# Patient Record
Sex: MALE | Race: WHITE | Employment: FULL TIME | URBAN - METROPOLITAN AREA
[De-identification: names, ages, dates, MRNs, and addresses within clinical notes are randomized per-mention and may not be internally consistent; named-entity substitution may affect disease eponyms.]

---

## 2017-03-23 ENCOUNTER — OFFICE VISIT (OUTPATIENT)
Dept: ORTHOPEDIC SURGERY | Age: 37
End: 2017-03-23

## 2017-03-23 VITALS
WEIGHT: 185 LBS | DIASTOLIC BLOOD PRESSURE: 84 MMHG | HEIGHT: 73 IN | SYSTOLIC BLOOD PRESSURE: 133 MMHG | HEART RATE: 77 BPM | BODY MASS INDEX: 24.52 KG/M2

## 2017-03-23 DIAGNOSIS — Z98.890 STATUS POST ANTERIOR CRUCIATE LIGAMENT SURGERY: Primary | ICD-10-CM

## 2017-03-23 PROCEDURE — 99203 OFFICE O/P NEW LOW 30 MIN: CPT | Performed by: ORTHOPAEDIC SURGERY

## 2017-03-23 RX ORDER — FINASTERIDE 5 MG/1
5 TABLET, FILM COATED ORAL DAILY
COMMUNITY
End: 2020-11-12

## 2019-04-12 ENCOUNTER — OFFICE VISIT (OUTPATIENT)
Dept: ORTHOPEDIC SURGERY | Age: 39
End: 2019-04-12
Payer: COMMERCIAL

## 2019-04-12 VITALS
WEIGHT: 190 LBS | HEIGHT: 73 IN | SYSTOLIC BLOOD PRESSURE: 126 MMHG | DIASTOLIC BLOOD PRESSURE: 70 MMHG | HEART RATE: 69 BPM | BODY MASS INDEX: 25.18 KG/M2

## 2019-04-12 DIAGNOSIS — M25.561 RIGHT KNEE PAIN, UNSPECIFIED CHRONICITY: Primary | ICD-10-CM

## 2019-04-12 PROBLEM — J30.9 AR (ALLERGIC RHINITIS): Status: ACTIVE | Noted: 2019-04-12

## 2019-04-12 PROBLEM — M48.02 FORAMINAL STENOSIS OF CERVICAL REGION: Status: ACTIVE | Noted: 2018-12-26

## 2019-04-12 PROBLEM — R31.9 HEMATURIA OF UNDIAGNOSED CAUSE: Status: ACTIVE | Noted: 2019-04-12

## 2019-04-12 PROCEDURE — 99204 OFFICE O/P NEW MOD 45 MIN: CPT | Performed by: ORTHOPAEDIC SURGERY

## 2019-04-12 SDOH — HEALTH STABILITY: MENTAL HEALTH: HOW OFTEN DO YOU HAVE A DRINK CONTAINING ALCOHOL?: NEVER

## 2019-04-12 NOTE — PROGRESS NOTES
Chief Complaint  Knee Pain (np right knee. )      History of Present Illness:  Joey Agarwal is a pleasant 37 y.o. male here today for evaluation of RIGHT knee. 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 Comes in with 6 weeks of RIGHT knee pain after hitting his right foot causing him to hyperextend his right knee. He has been taking ibuprofen at home without much relief. He continues to have persistent right knee pain. No numbness or tingling reported in the right lower extremity. He is concerned because he is a very active individual and wants to be ready to enjoy summer. Medical History:  Patient's medications, allergies, past medical, surgical, social and family histories were reviewed and updated as appropriate. Pertinent items are noted in HPI  Review of systems reviewed from Patient History Form dated on 4/12/2019 and available in the patient's chart under the Media tab. Vital Signs:  Vitals:    04/12/19 1003   BP: 126/70   Pulse: 69         Neuro: Alert & oriented x 3,  normal,  no focal deficits noted. Normal affect. Eyes: sclera clear  Ears: Normal external ear  Mouth:  No perioral lesions  Pulm: Respirations unlabored and regular  Pulse: Regular rate and rhythm   Skin: Warm, well perfused      Constitutional: In no apparent distress. Normal affect. Alert and oriented X3 and is cooperative. RIGHT knee exam    Gait: No use of assistive devices. No antalgic gait. Alignment: normal alignment. Inspection/skin: Skin is intact without erythema or ecchymosis. No gross deformity. Palpation: Mild medial joint line tenderness. no lateral joint line tenderness present. Patellofemoral crepitus. no pain with compression of the patella. No bursal swelling is present. Range of Motion: There is full range of motion. Strength: Normal quadriceps development. Effusion: Small effusion. No swelling present. Ligamentous stability: No cruciate or collateral ligament instability. Anterior and posterior drawer signs are negative. Lachman sign is negative. Neurologic and vascular: Skin is warm and well-perfused. There is no pretibial edema. Pulses are symmetric. Sensation is intact to light-touch    Special tests: Mildly positive El sign. The patella apprehension sign is negative. LEFT knee exam    Gait: No use of assistive devices. No antalgic gait. Alignment: normal alignment. Inspection/skin: Skin is intact without erythema or ecchymosis. No gross deformity. Palpation: mild crepitus. no joint line tenderness present. Range of Motion: There is full range of motion. Strength: Normal quadriceps development. Effusion: No effusion or swelling present. Ligamentous stability: No cruciate or collateral ligament instability. Neurologic and vascular: Skin is warm and well-perfused. Sensation is intact to light-touch. Special tests: Negative El sign. Radiology:     Radiographs were obtained and reviewed in the office; 4 views: bilateral PA, bilateral Corwin Musca, bilateral Merchants AND RIGHT lateral    Impression: mild degenerative change         Assessment :  39yo male 20 years status of ACL reconstruction with 6 weeks of persistent right knee pain with concern for a medial meniscus tear. Impression:  Encounter Diagnosis   Name Primary?     Right knee pain, unspecified chronicity Yes       Office Procedures:  Orders Placed This Encounter   Procedures    XR KNEE RIGHT (MIN 4 VIEWS)     Standing Status:   Future     Number of Occurrences:   1     Standing Expiration Date:   4/12/2020    XR KNEE LEFT (3 VIEWS)     Standing Status:   Future     Number of Occurrences:   1     Standing Expiration Date:   4/12/2020    MRI KNEE RIGHT WO CONTRAST     Standing Status:   Future     Standing Expiration Date:   4/12/2020     Order Specific Question:   Reason for exam: Answer:   MRI R KNEE W/3D  R/O MMT     Order Specific Question:   Reason for exam:     Answer:   Rajesh Otero 4/15         Plan: X-rays and suspected diagnosis were reviewed with the patient. At this time we would obtain an MRI of his right knee to rule out a medial meniscus tear. He is also requesting a new right knee [employer] motorcross brace, he reports that every so many years he gets a new one. We will have our DME coordinate the brace. Followup for MRI results or sooner if needed. Precious Beverly is in agreement with this plan. All questions were answered to patient's satisfaction and was encouraged to call with any further questions. Reagan Benoit PA-C  4/12/2019       During this examination, I, Reagan Benoit PA-C, functioned as a scribe for Dr. Farheen Terrell. The history taking and physical examination were performed by Dr. Farheen Terrell. All counseling during the appointment was performed between the patient and Dr. Farheen Terrell. 4/12/2019 10:49 AM      This dictation was performed with a verbal recognition program (DRAGON) and it was checked for errors. It is possible that there are still dictated errors within this office note. If so, please bring any errors to my attention for an addendum. All efforts were made to ensure that this office note is accurate. I personally reviewed the patient's pain scale, review of systems, family history, social history, past medical history, allergies and medications. 13 point review of systems was collected and reviewed today. It is noncontributory. I personally performed the services described in this documentation and scribed by Reagan Benoit PA-C. Lilly Weeks MD  Sports Medicine, Arthroscopic Knee and Shoulder Surgery    This dictation was performed with a verbal recognition program [facility]) and it was checked for errors. It is possible that there are still dictated errors within this office note.   If so, please bring any errors to my attention for an addendum. All efforts were made to ensure that this office note is accurate.

## 2019-04-12 NOTE — PROGRESS NOTES
Review of Systems   Musculoskeletal:        Right knee pain    All other systems reviewed and are negative.

## 2020-09-29 ENCOUNTER — OFFICE VISIT (OUTPATIENT)
Dept: ORTHOPEDIC SURGERY | Age: 40
End: 2020-09-29
Payer: COMMERCIAL

## 2020-09-29 VITALS — TEMPERATURE: 97.3 F | BODY MASS INDEX: 26.51 KG/M2 | WEIGHT: 200 LBS | HEIGHT: 73 IN

## 2020-09-29 PROCEDURE — 99213 OFFICE O/P EST LOW 20 MIN: CPT | Performed by: PHYSICIAN ASSISTANT

## 2020-09-30 NOTE — PROGRESS NOTES
None    Number of children: None    Years of education: None    Highest education level: None   Occupational History    None   Social Needs    Financial resource strain: None    Food insecurity     Worry: None     Inability: None    Transportation needs     Medical: None     Non-medical: None   Tobacco Use    Smoking status: Never Smoker    Smokeless tobacco: Never Used   Substance and Sexual Activity    Alcohol use: Never     Frequency: Never    Drug use: Never    Sexual activity: None   Lifestyle    Physical activity     Days per week: None     Minutes per session: None    Stress: None   Relationships    Social connections     Talks on phone: None     Gets together: None     Attends Anglican service: None     Active member of club or organization: None     Attends meetings of clubs or organizations: None     Relationship status: None    Intimate partner violence     Fear of current or ex partner: None     Emotionally abused: None     Physically abused: None     Forced sexual activity: None   Other Topics Concern    None   Social History Narrative    None       Current Outpatient Medications   Medication Sig Dispense Refill    finasteride (PROSCAR) 5 MG tablet Take 5 mg by mouth daily      Mirabegron ER (MYRBETRIQ) 25 MG TB24 Take by mouth       No current facility-administered medications for this visit. No Known Allergies      Review of Systems:  A 14 point review of systems was completed by the patient and is available in the media section of the scanned medical record and was reviewed on 9/30/2020. The review is negative with the exception of those things mentioned in the HPI and Past Medical History   Review of Systems   Musculoskeletal: Positive for arthralgias. Negative for myalgias. Neurological: Negative for weakness and numbness.           Vital Signs:   Temp 97.3 °F (36.3 °C) (Infrared)   Ht 6' 1\" (1.854 m)   Wt 200 lb (90.7 kg)   BMI 26.39 kg/m²     General Exam: General: Brisa Sandoval is a healthy and well appearing 41y.o. year old male who is sitting comfortably in our office in no acute distress. Neuro: Alert & Oriented x 3,  normal,  no focal deficits noted. Normal mood & affect. Eyes: Sclera clear  Ears: Normal external ear  Mouth: Patient wearing a mask  Pulm: Respirations unlabored and regular   Skin: Warm, well perfused      Knee Examination: Right    Inspection:  Mild effusion. No ecchymosis, discoloration, erythema, excessive warmth. no gross deformities, no signs of infection. Palpation: There is patellofemoral crepitus, there is lateral and posteriolateral joint line tenderness. TTP of the posterolateral corner and mild LCL tenderness. No other osseous or soft tissue tenderness around the knee. Negative calf tenderness    Range of Motion: 0-110 degrees with comfort to the lateral and posteriolateral joint line at terminal flexion and extension. Appropriate patellar mobility    Strength:  5/5 quadriceps, 5/5 hamstrings    Special Tests:  Valgus and varus stress test are stable at 0 and 30°. Apprehension with Lockman's. Negative posterior drawer. Negative Homans sign. Positive El's with lateral joint line tenderness. Gait: Steady, Non antalgic, without the use of assistive devices    Alignment: Neutral    Neuro: Sensation equal bilateral lower extremities    Vascular: 2+ posterior tibialis pulse        Radiology:     X-rays obtained and reviewed in office: AP and merchant view of both knees and a lateral of the right. Impression: Visible bone tunnels from prior ACL reconstruction. No fractures, dislocations, visible tumors, or signs of acute trauma. Patient exhibits mildly decreased joint space in the medial and lateral femoral-tibial compartments bilaterally. Patient has decreased joint spacing in the patellofemoral joints bilaterally. Patella is riding appropriately in the trochlear groove with no subluxation or tilt noted.   No osteophytes or bone growths noted. No loose bodies or foreign bodies. Office Procedures:  Orders Placed This Encounter   Procedures    XR KNEE RIGHT (3 VIEWS)     Standing Status:   Future     Number of Occurrences:   1     Standing Expiration Date:   9/29/2021            Assessment: Patient is a 41 y.o. male presenting to the office for an evaluation of his acute right knee pain. Patient is a working diagnosis of a posterior lateral corner injury, lateral meniscus tear, and or an LCL sprain. Visit Diagnoses       Codes    Injury of posterolateral corner of right knee, initial encounter    -  Primary S89. 91XA    Right knee pain, unspecified chronicity     M25.561          Medical decision making:  Patient's workup and evaluation were reviewed with the patient in the office today. Patient's x-ray imaging was reviewed with him in the office today. Given the patient's history and physical exam I believe the reasonable degree of medical certainty that the patient has sustained a posterior lateral corner injury or a meniscus tear. For this reason I believe the patient has a medical necessity to obtain an MRI of the right knee to evaluate for soft tissue pathology. Patient was educated on conservative therapy as detailed in the treatment plan below. He should follow-up in the office once receiving MRI imaging to review the results and further coordinate care. All the patient's questions were answered while in the clinic. The patient is understanding of all instructions and agrees with the plan. Patient does not smoke and did not require smoking cessation patient education. Treatment Plan:    1. MRI of the right knee without contrast: Investigating for possible posterior lateral corner injury as well as a lateral meniscus tear and LCL sprain. Majority of the patient's pain is in the posterior lateral corner.   2. Refrain from dirt bike racing or any recreational or physical activity that worsens pain or symptoms  3. Activity modification  4. Ice 20 minutes ever 1-2 hours PRN  5. NSAIDs as needed  6. Rest   7. Elevation at least 2 inches above the heart with pillows supporting the joint underneath  8. Lightweight exercise/ROM  9. Appropriate diet/weight management      Follow up: 1 week or PRN            Corby Tovar PA-C    Physician Assistant - Certified    92/58/53 10:35 AM      This dictation was performed with a verbal recognition program (DRAGON) and it was checked for errors. It is possible that there are still dictated errors within this office note. If so, please bring any errors to my attention for an addendum. All efforts were made to ensure that this office note is accurate.

## 2020-10-15 ENCOUNTER — OFFICE VISIT (OUTPATIENT)
Dept: ORTHOPEDIC SURGERY | Age: 40
End: 2020-10-15
Payer: COMMERCIAL

## 2020-10-15 VITALS — TEMPERATURE: 97.7 F | HEIGHT: 73 IN | WEIGHT: 199.96 LBS | BODY MASS INDEX: 26.5 KG/M2

## 2020-10-15 PROCEDURE — 99213 OFFICE O/P EST LOW 20 MIN: CPT | Performed by: ORTHOPAEDIC SURGERY

## 2020-10-18 NOTE — PROGRESS NOTES
12 [facility]  History and Physical  Knee Pain    Date:  10/15/2020    Name:  Fern Fonseca  Address:  [address]  [address] [address]    :  1980      Age:   41 y.o.    SSN:  [id number]      Medical Record Number:  <[id number]>      Chief Complaint    Follow-up (right knee, MRI results )      History of Present Illness:  Fern Fonseca is a 41 y.o. male who presents for follow-up evaluation of his right knee pain and review of his MRI results. This patient is a previous patient of Dr. Leonardo Bruno and has a known past medical history of an ACL reconstruction over 20 years ago in the right knee. He also is a known MCL reconstruction. Patient races dirt bikes competitively and states that approximately 2 days ago he was attempting to make a right turn with his right leg extended and felt that his leg hit the dirt and \"jammed\" superiorly. Since then he has been experiencing pain on the lateral and posterior lateral aspect of the right knee. He notes swelling and effusion with pain at endrange knee flexion and extension. He notes mild instability and intermittent sensation of catching. He is attempted conservative therapy for this condition including: rest, ice, elevation, bracing, activity modification, and NSAIDs as needed. Today the patient feels that his pain is gotten a bit better. However he experiences majority of his pain when he flexes his knee past 90 degrees. Patient once again attest that his symptoms have been persistent for the last 3 to 4 weeks. The patient denies any new injury. The patient denies any giving way, joint locking, numbness, paresthesias, or weakness. Pain Assessment  Location of Pain: Knee  Location Modifiers: Right  Severity of Pain: 4  Quality of Pain: Dull, Aching  Duration of Pain: Persistent  Frequency of Pain: Constant  Aggravating Factors:  Other (Comment)(flexion)  Relieving Factors: Rest  Result of Injury: Yes  Work-Related Injury: No  Are there other pain locations you wish to document?: No    No past medical history on file. No past surgical history on file. No family history on file. Social History     Socioeconomic History    Marital status:      Spouse name: None    Number of children: None    Years of education: None    Highest education level: None   Occupational History    None   Social Needs    Financial resource strain: None    Food insecurity     Worry: None     Inability: None    Transportation needs     Medical: None     Non-medical: None   Tobacco Use    Smoking status: Never Smoker    Smokeless tobacco: Never Used   Substance and Sexual Activity    Alcohol use: Never     Frequency: Never    Drug use: Never    Sexual activity: None   Lifestyle    Physical activity     Days per week: None     Minutes per session: None    Stress: None   Relationships    Social connections     Talks on phone: None     Gets together: None     Attends Pentecostal service: None     Active member of club or organization: None     Attends meetings of clubs or organizations: None     Relationship status: None    Intimate partner violence     Fear of current or ex partner: None     Emotionally abused: None     Physically abused: None     Forced sexual activity: None   Other Topics Concern    None   Social History Narrative    None       Current Outpatient Medications   Medication Sig Dispense Refill    finasteride (PROSCAR) 5 MG tablet Take 5 mg by mouth daily      Mirabegron ER (MYRBETRIQ) 25 MG TB24 Take by mouth       No current facility-administered medications for this visit. No Known Allergies      Review of Systems:  A 14 point review of systems was completed by the patient and is available in the media section of the scanned medical record and was reviewed on 10/18/2020.   The review is negative with the exception of those things mentioned in the HPI and Past Medical History Review of Systems   Musculoskeletal: Positive for arthralgias. Negative for myalgias. Neurological: Negative for weakness and numbness. Vital Signs:   Temp 97.7 °F (36.5 °C) (Infrared)   Ht 6' 0.99\" (1.854 m)   Wt 199 lb 15.3 oz (90.7 kg)   BMI 26.39 kg/m²     General Exam:    General: Dee Dee Francis is a healthy and well appearing 41y.o. year old male who is sitting comfortably in our office in no acute distress. Neuro: Alert & Oriented x 3,  normal,  no focal deficits noted. Normal mood & affect. Eyes: Sclera clear  Ears: Normal external ear  Mouth: Patient wearing a mask  Pulm: Respirations unlabored and regular   Skin: Warm, well perfused      Knee Examination: Right     Inspection:  Mild effusion. No ecchymosis, discoloration, erythema, excessive warmth. no gross deformities, no signs of infection.      Palpation: There is patellofemoral crepitus, there is lateral and posteriolateral joint line tenderness. TTP of the posterolateral corner and mild LCL tenderness. No other osseous or soft tissue tenderness around the knee. Negative calf tenderness     Range of Motion: 0-110 degrees with comfort to the lateral and posteriolateral joint line at terminal flexion and extension.      Strength: Grossly intact     Special Tests: Negative Homans sign. Positive El's with lateral joint line tenderness.     Gait: Steady, Non antalgic, without the use of assistive devices     Alignment: Neutral     Neuro: Sensation equal bilateral lower extremities     Vascular: 2+ posterior tibialis pulse         Radiology:   MRI of the right knee without contrast conducted on 10/05/2020  Impression  1. Radial tear of the posterior horn of the lateral meniscus measuring approximately 1 cm in length with mild meniscus extrusion and mild capsulitis. 2.  Cleavage tear to the body of the lateral meniscus measuring 2 cm in length. 3.  Status post ACL reconstruction.   ACL graft intact without evidence of cruciate insufficiency. 4.  Subchondral fracture to the posterior lateral tibial plateau with subchondral edema. 5.  Insertional quadricep tendinitis without micro tear. Assessment: Patient is a 41 y.o. male presenting to the office for review of his MRI results. Patient has diagnosis of a lateral meniscus tear in the right knee as well as a subchondral fracture of the posterior lateral tibial plateau. Visit Diagnoses       Codes    Acute lateral meniscus tear of right knee, initial encounter    -  Primary S83.281A    Right knee pain, unspecified chronicity     M25.561          Medical decision making:  Patient's workup and evaluation were reviewed with the patient in the office today. Patient's MRI results and imaging were thoroughly reviewed with him in the office today. Given the patient's history and physical exam I believe with a reasonable degree of medical certainty that the most appropriate course of treatment for this patient is to undergo a right knee arthroscopy for partial lateral meniscectomy versus repair. Patient was educated on conservative versus surgical treatment options as well as the risks and benefits of both. Patient agrees that the most appropriate course of action is a right knee arthroscopy. In addition, we will also be sending the MRI for a reread by Dr. Winnie Redding. There is some question as to the type of tear as well as the extent of the tear. The MRI read notes a 1 cm tear with mild extrusion but also notes a 2 cm cleavage tear to the body of the meniscus. We would like additional clarification. All the patient's questions were answered while in the clinic. The patient is understanding of all instructions and agrees with the plan. Patient does not smoke and did not require smoking cessation patient education. Treatment Plan:    1. Begin preoperative planning  2. Activity modification  3. Ice 20 minutes ever 1-2 hours PRN  4. NSAIDs as needed  5. Rest   6.  Elevation at least 2 inches above the heart with pillows supporting the joint underneath  7. Lightweight exercise/low impact exercise  8. Appropriate diet/weight management      Follow up: RAZ Mancilla PA-C    Physician Assistant - Certified    97/94/67 4:00 PM    During this examination, ITeresa [place], functioned as a scribe for Dr. Lane Chaudhari. This dictation was performed with a verbal recognition program (DRAGON) and it was checked for errors. It is possible that there are still dictated errors within this office note. If so, please bring any errors to my attention for an addendum. All efforts were made to ensure that this office note is accurate. This dictation was performed with a verbal recognition program (DRAGON) and it was checked for errors. It is possible that there are still dictated errors within this office note. If so, please bring any errors to my attention for an addendum. All efforts were made to ensure that this office note is accurate. Supervising Physician Attestation:  I, Dr. Lane Chaudhari, personally performed the services described in this documentation as scribed above, and it is both accurate and complete and I agree with all pertinent clinical information. I personally interviewed the patient and performed a physical examination and medical decision making. I discussed the patient's condition and treatment options and have  reviewed and agree with the past medical, family and social history unless otherwise noted. All of the patient's questions were answered.       Board Certified Orthopaedic Surgeon  [address] [facility] and [address] and [address] and [employer]  Professor of [facility] Sadi Bhatt

## 2020-11-06 ENCOUNTER — TELEPHONE (OUTPATIENT)
Dept: ORTHOPEDIC SURGERY | Age: 40
End: 2020-11-06

## 2020-11-09 NOTE — PROGRESS NOTES
[facility] [email or web address] / [facility] ([facility]) [address] [facility], [address]    Acknowledgment of Informed Consent for Surgical or Medical Procedure and Sedation  I agree to allow doctor(s) Helga Forrest and his/her associates or assistants, including residents and/or other qualified medical practitioner to perform the following medical treatment or procedure and to administer or direct the administration of sedation as necessary:  Procedure(s): RIGHT KNEE SCOPE [address] / Arlen Bruno  My doctor has explained the following regarding the proposed procedure:   the explanation of the procedure   the benefits of the procedure   the potential problems that might occur during recuperation   the risks and side effects of the procedure which could include but are not limited to severe blood loss, infection, stroke or death   the benefits, risks and side effect of alternative procedures including the consequences of declining this procedure or any alternative procedures   the likelihood of achieving satisfactory results. I acknowledge no guarantee or assurance has been made to me regarding the results. I understand that during the course of this treatment/procedure, unforeseen conditions can occur which require an additional or different procedure. I agree to allow my physician or assistants to perform such extension of the original procedure as they may find necessary. I understand that sedation will often result in temporary impairment of memory and fine motor skills and that sedation can occasionally progress to a state of deep sedation or general anesthesia. I understand the risks of anesthesia for surgery include, but are not limited to, sore throat, hoarseness, injury to face, mouth, or teeth; nausea; headache; injury to blood vessels or nerves; death, brain damage, or paralysis.     I understand that if I have a Limitation of Treatment order in effect during my hospitalization, the order may or may not be in effect during this procedure. I give my doctor permission to give me blood or blood products. I understand that there are risks with receiving blood such as hepatitis, AIDS, fever, or allergic reaction. I acknowledge that the risks, benefits, and alternatives of this treatment have been explained to me and that no express or implied warranty has been given by the hospital, any blood bank, or any person or entity as to the blood or blood components transfused. At the discretion of my doctor, I agree to allow observers, equipment/product representatives and allow photographing, and/or televising of the procedure, provided my name or identity is maintained confidentially. I agree the hospital may dispose of or use for scientific or educational purposes any tissue, fluid, or body parts which may be removed.     ________________________________Date________Time______ am/pm  ([place] [facility])  Patient or Signature of Closest Relative or Legal Guardian    ________________________________Date________Time______am/pm      Page 1 of  1  Witness

## 2020-11-11 ENCOUNTER — NURSE ONLY (OUTPATIENT)
Dept: PRIMARY CARE CLINIC | Age: 40
End: 2020-11-11
Payer: COMMERCIAL

## 2020-11-11 PROCEDURE — 99211 OFF/OP EST MAY X REQ PHY/QHP: CPT | Performed by: NURSE PRACTITIONER

## 2020-11-12 ENCOUNTER — OFFICE VISIT (OUTPATIENT)
Dept: ORTHOPEDIC SURGERY | Age: 40
End: 2020-11-12
Payer: COMMERCIAL

## 2020-11-12 ENCOUNTER — HOSPITAL ENCOUNTER (OUTPATIENT)
Dept: PHYSICAL THERAPY | Age: 40
Setting detail: THERAPIES SERIES
Discharge: HOME | End: 2020-11-12
Payer: COMMERCIAL

## 2020-11-12 VITALS — HEIGHT: 73 IN | WEIGHT: 199.96 LBS | TEMPERATURE: 97.9 F | BODY MASS INDEX: 26.5 KG/M2

## 2020-11-12 LAB — SARS-COV-2: NOT DETECTED

## 2020-11-12 PROCEDURE — MISC250 COMPRESSION STOCKING: Performed by: ORTHOPAEDIC SURGERY

## 2020-11-12 PROCEDURE — 97162 PT EVAL MOD COMPLEX 30 MIN: CPT | Performed by: PHYSICAL THERAPIST

## 2020-11-12 PROCEDURE — 97530 THERAPEUTIC ACTIVITIES: CPT | Performed by: PHYSICAL THERAPIST

## 2020-11-12 PROCEDURE — 99213 OFFICE O/P EST LOW 20 MIN: CPT | Performed by: ORTHOPAEDIC SURGERY

## 2020-11-12 RX ORDER — M-VIT,TX,IRON,MINS/CALC/FOLIC 27MG-0.4MG
1 TABLET ORAL DAILY
COMMUNITY

## 2020-11-12 ASSESSMENT — ENCOUNTER SYMPTOMS
GASTROINTESTINAL NEGATIVE: 1
RESPIRATORY NEGATIVE: 1
EYES NEGATIVE: 1
ALLERGIC/IMMUNOLOGIC NEGATIVE: 1

## 2020-11-12 NOTE — FLOWSHEET NOTE
41.8 41.2   5cm above 47.5 45.3   15cm above          Reflexes/Sensation:               [x]? Dermatomes/Myotomes intact               []?Reflexes equal and normal bilaterally              []?Other:     Joint mobility:                [x]? Normal                       []?Hypo              []?Hyper     Palpation: LJL     Functional Mobility/Transfers: mod ind     Posture: good     Bandages/Dressings/Incisions: n/a     Gait: (include devices/WB status) WNL       RESTRICTIONS/PRECAUTIONS: none    Exercises/Interventions:   Exercise/Equipment Resistance/Repetitions Other comments   Stretching     Hamstring     Towel Pull     Inclined Calf     Hip Flexion     ITB     Groin     Quad                    SLR     Supine     Abduction     Adduction     Prone     SLR+          Isometrics     Quad sets          Patellar Glides     Medial     Superior     Inferior          ROM     Sheet Pulls     Hang Weights     Passive     Active     Weight Shift     Ankle Pumps                    CKC     Calf raises     Wall sits     Step ups     1 leg stand     Squatting     CC TKE     Balance     bridges          PRE     Extension  RANGE:   Flexion  RANGE:        Quantum machines     Leg press      Leg extension     Leg curl          Manual interventions                     Therapeutic Exercise and NMR EXR  [x] (72569) Provided verbal/tactile cueing for activities related to strengthening, flexibility, endurance, ROM for improvements in LE, proximal hip, and core control with self care, mobility, lifting, ambulation.  [] (70415) Provided verbal/tactile cueing for activities related to improving balance, coordination, kinesthetic sense, posture, motor skill, proprioception  to assist with LE, proximal hip, and core control in self care, mobility, lifting, ambulation and eccentric single leg control.      NMR and Therapeutic Activities:    [x] (55737 or 81183) Provided verbal/tactile cueing for activities related to improving balance, coordination, Adjusted     Therapist goals for Patient:   Short Term Goals: To be achieved in: 2 weeks  1. Independent in HEP and progression per patient tolerance, in order to prevent re-injury. []? Progressing: []? Met: []? Not Met: []? Adjusted   2. Patient will have a decrease in pain to facilitate improvement in movement, function, and ADLs as indicated by Functional Deficits. []? Progressing: []? Met: []? Not Met: []? Adjusted     Long Term Goals: To be achieved in: 12 weeks  1. Disability index score of 15% or less for the LEFS to assist with reaching prior level of function. []? Progressing: []? Met: []? Not Met: []? Adjusted  2. Patient will demonstrate increased AROM to 0-140 deg to allow for proper joint functioning as indicated by patients Functional Deficits. []? Progressing: []? Met: []? Not Met: []? Adjusted  3. Patient will demonstrate an increase in Strength to good proximal hip strength and control, within 5lb HHD in LE to allow for proper functional mobility as indicated by patients Functional Deficits. []? Progressing: []? Met: []? Not Met: []? Adjusted  4. Patient will return to household functional activities without increased symptoms or restriction. []? Progressing: []? Met: []? Not Met: []? Adjusted  5. Pt will be able to walk for at least 20 min without symptoms present. (patient specific functional goal)    []? Progressing: []? Met: []? Not Met: []? Adjusted          Overall Progression Towards Functional goals/ Treatment Progress Update:  [] Patient is progressing as expected towards functional goals listed. [] Progression is slowed due to complexities/Impairments listed. [] Progression has been slowed due to co-morbidities.   [x] Plan just implemented, too soon to assess goals progression <30days   [] Goals require adjustment due to lack of progress  [] Patient is not progressing as expected and requires additional follow up with physician  [] Other    Prognosis for POC: [x] Good [] Fair  [] Poor      Patient requires continued skilled intervention: [x] Yes  [] No    Treatment/Activity Tolerance:  [x] Patient able to complete treatment  [] Patient limited by fatigue  [] Patient limited by pain     [] Patient limited by other medical complications  [] Other:     HEP instruction:   Access Code: [email or web address]       URL: [email or web address]   Date: 11/12/2020   Prepared by: Mirella Ramirez      Exercises  Seated Table Hamstring Stretch - 5 reps - 1 sets - 30 hold - 3x daily - 7x weekly  Long Sitting Calf Stretch with Strap - 5 reps - 1 sets - 30 hold - 3x daily - 7x weekly  Long Sitting Quad Set - 10 reps - 1 sets - 10 hold - 12x daily - 7x weekly  Long Sitting Isometric Hip Adduction and Extension with Ball at Knees - 10 reps - 1 sets - 10 hold - 3x daily - 7x weekly  Supine Straight Leg Raises - 10 reps - 3 sets - 3x daily - 7x weekly   Sidelying Hip Abduction - 10 reps - 3 sets - 3x daily - 7x weekly  Supine Ankle Pumps - 10 reps - 3 sets - 12x daily - 7x weekly  Supine Knee Extension Stretch on Towel Roll - 1 reps - 10 min hold - 3-6x daily - 7x weekly  Seated Active Assistive Knee Flexion and Extension - 10 reps - 1 sets - 10 sec hold - 3-6x daily - 7x weekly  Sitting Heel Slide with Towel - 10 reps - 1 sets - 10 sec hold - 3-6x daily - 7x weekly       PLAN: See eval  [] Continue per plan of care [] Alter current plan (see comments above)  [x] Plan of care initiated [] Hold pending MD visit [] Discharge      Electronically signed by:  Nora Romero PT, DPT    Note: If patient does not return for scheduled/ recommended follow up visits, this note will serve as a discharge from care along with most recent update on progress.

## 2020-11-12 NOTE — PROGRESS NOTES
[facility]  History and Physical      Date:  2020    Name:  Moris Pinto  Address:  [address]  [address] [address]    :  1980      Age:   41 y.o.    SSN:  [id number]      Medical Record Number:  <[id number]>      Chief Complaint    Pre-op Exam (right knee LM on 20)      History of Present Illness:  Moris Pinto is a 41 y.o. male who presents for their pre-op examination. The patient is in good health. The patient has no history of blood clots, no organ dysfunction, not diabetes, no known allergies to medications, and tolerates pain medications. Patient notes some pre-existing numbness to the lateral joint line and a 6 x 6 cm diameter centralized over the LCL. Patient does not take any medications and is not on blood thinners. Prior history: This patient is a previous patient of Dr. Saima Scott and has a known past medical history of an ACL reconstruction over 20 years ago in the right knee. He also is a known MCL reconstruction. 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 Since then he has been experiencing pain on the lateral and posterior lateral aspect of the right knee. He notes swelling and effusion with pain at endrange knee flexion and extension. He notes mild instability and intermittent sensation of catching. Pain Assessment  Location of Pain: Knee  Location Modifiers: Right  Severity of Pain: 2  Quality of Pain: Sharp, Dull  Duration of Pain: Persistent  Frequency of Pain: Constant  Aggravating Factors: Other (Comment)(flexion)  Relieving Factors:  Other (Comment)(n/a)  Result of Injury: Yes  Work-Related Injury: No  Are there other pain locations you wish to document?: No    Past Medical History:   Diagnosis Date    Arthritis         Past Surgical a 41 y.o. male presenting to the office for his preoperative visit. Patient is scheduled to undergo a right knee arthroscopy for a lateral medial meniscectomy versus repair on 11/16/2020. Visit Diagnoses       Codes    Acute lateral meniscus tear of right knee, initial encounter    -  Primary S83.281A    Right knee pain, unspecified chronicity     M25.561            Medical decision making/treatment plan:  Patient's workup and evaluation were reviewed with the patient in the office today. The perioperative and postoperative course was thoroughly reviewed with the patient in the office today. We discussed the risks, benefits and potential complications of arthroscopic knee surgery. The patient realizes that there are concerns with surgery with respect to infection, deep vein thrombosis, neurologic injury, delayed rehabilitation, the possibility of arthrofibrosis and Hoffa's fat pad issues. The patient also realizes that there are also concerns from an anesthesia standpoint including cardiopulmonary issues, drug reactions and even death. The patient voiced an understanding to the usual rehabilitation that involves physical therapy, exercise and strengthening. The patient further understands that even though the surgery, from a functional perspective, typically allows good function in about 6 weeks that full recovery can take up to 6 months. Lastly, the patient understands that if there is a component of arthritis, they may still have pain associated with their knee. All the patient's questions were answered while in the clinic. The patient is understanding of all instructions and agrees with the plan. 11/12/20  5:01 PM                Zack Gotti PA-C    Physician Assistant - Certified  [facility] and [address] [place] [address]    11/12/20 5:01 PM    During this examination, I, [address] [place] [address], functioned as a scribe for Dr. Deon Pleitez.        This

## 2020-11-12 NOTE — PROGRESS NOTES
[facility] PRE-SURGICAL TESTING INSTRUCTIONS                              PRIOR TO PROCEDURE DATE:  1. Please follow any guidelines/instructions prior to your procedure as advised by your surgeon. 2. Arrange for someone to drive you home and be with you for the first 24 hours after discharge for your safety after your procedure for which you received sedation. Ensure it is someone we can share information with regarding your discharge. 3. You must contact your surgeon for instructions IF:   You are taking any blood thinners, aspirin, anti-inflammatory or vitamin E.   There is a change in your physical condition such as a cold, fever, rash, cuts, sores or any other infection, especially near your surgical site. 4. Do not drink alcohol the day before or day of your procedure. 5. A Pre-op History and Physical for surgery MUST be completed by your Physician or Urgent Care within 30 days of your procedure date. Please bring a copy with you on the day of your procedure and along with any other testing performed. THE DAY OF YOUR PROCEDURE:  1. Follow instructions for ARRIVAL TIME as DIRECTED BY YOUR SURGEON. I    2. Enter the MAIN entrance from [employer] and follow the signs to the free [employer] or [employer] parking (offered free of charge 6am-5pm). 3. Enter the Main Entrance of the hospital (do not enter from the lower level of the parking garage). Upon entrance, check in with the  at the main desk on your left. If no one is available at the desk, proceed into the [facility] Waiting Room and go through the door directly into the [facility]. There is a Check-in desk ACROSS from Room 5 (marked with a sign hanging from the ceiling). The phone number for the surgery center is [phone]. 4. Please call [phone] option #2 option #2 if you have not been preregistered yet. On the day of your procedure bring your insurance card and photo ID.  You will be registered at your bedside once brought back to your room. 5. DO NOT EAT ANYTHING eight hours prior to surgery. May have 8 ounces of water 4 hours prior to surgery. 6. MEDICATIONS    Take the following medications with a SMALL sip of water:    Use your usual dose of inhalers the morning of surgery. BRING your rescue inhaler with you to hospital.    Anesthesia does NOT want you to take insulin the morning of surgery. They will control your blood sugar while you are at the hospital. Please contact your ordering physician for instructions regarding your insulin the night before your procedure. If you have an insulin pump, please keep it set on basal rate. 7. Do not swallow water when brushing teeth. No gum, candy, mints or ice chips. Refrain from smoking or at least decrease the amount. 8. Dress in loose, comfortable clothing appropriate for redressing after your procedure. Do not wear jewelry (including body piercings), make-up (especially NO eye make-up), fingernail polish (NO toenail polish if foot/leg surgery), lotion, powders or metal hairclips. 9. Dentures, glasses, or contacts will need to be removed before your procedure. Bring cases for your glasses, contacts, dentures, or hearing aids to protect them while you are in surgery. 10. If you use a CPAP, please bring it with you on the day of your procedure. 11. We recommend that valuable personal  belongings such as cash, cell phones, e-tablets or jewelry, be left at home during your stay. The hospital will not be responsible for valuables that are not secured in the hospital safe. However, if your insurance requires a co-pay, you may want to bring a method of payment, i.e. Check or credit card, if you wish to pay your co-pay the day of surgery. 12. If you are to stay overnight, you may bring a bag with personal items.  Please have any large items you may need brought in by your family after your arrival to your hospital room.    13. If you have a Living Will or Durable Power of , please bring a copy on the day of your procedure. 15. With your permission, one family member may accompany you while you are being prepared for surgery. Once you are ready, additional family members may join you. HOW WE KEEP YOU SAFE and WORK TO PREVENT SURGICAL SITE INFECTIONS:  1. Health care workers should always check your ID bracelet to verify your name and birth date. You will be asked many times to state your name, date of birth, and allergies. 2. Health care workers should always clean their hands with soap or alcohol gel before providing care to you. It is okay to ask anyone if they cleaned their hands before they touch you. 3. You will be actively involved in verifying the type of procedure you are having and ensuring the correct surgical site. This will be confirmed multiple times prior to your procedure. Do NOT mauricio your surgery site UNLESS instructed to by your surgeon. 4. Do not shave or wax for 72 hours prior to procedure near your operative site. Shaving with a razor can irritate your skin and make it easier to develop an infection. On the day of your procedure, any hair that needs to be removed near the surgical site will be clipped by a healthcare worker using a special clippers designed to avoid skin irritation. 5. When you are in the operating room, your surgical site will be cleansed with a special soap, and in most cases, you will be given an antibiotic before the surgery begins. What to expect AFTER YOUR PROCEDURE:  1. Immediately following your procedure, your will be taken to the PACU for the first phase of your recovery. Your nurse will help you recover from any potential side effects of anesthesia, such as extreme drowsiness, changes in your vital signs or breathing patterns. Nausea, headache, muscle aches, or sore throat may also occur after anesthesia.   Your nurse will help you manage these

## 2020-11-12 NOTE — PLAN OF CARE
The [address] [place] [address] Carmen [address]22  [phone]                                                       Physical Therapy Certification    Dear Referring Practitioner: Deon Pleitez,    We had the pleasure of evaluating the following patient for physical therapy services at [address] [place] [address]. A summary of our findings can be found in the initial assessment below. This includes our plan of care. If you have any questions or concerns regarding these findings, please do not hesitate to contact me at the office phone number checked above.   Thank you for the referral.       Physician Signature:_______________________________Date:__________________  By signing above (or electronic signature), therapists plan is approved by physician      Patient: Low Musa   : 1980   MRN: [id number]  Referring Physician: Referring Practitioner: Deon Pleitez      Evaluation Date: 2020      Medical Diagnosis Information:  Diagnosis: O70.359H (ICD-10-CM) - Acute lateral meniscus tear of right knee, initial encounter   Treatment Diagnosis: M25.561 R Knee Pain, M25.661 R Knee Stiffness, R53.1 Weakness                                         Insurance information: PT Insurance Information: PT BENEFITS 2020 FACILITY/ BCBS/ EFFECTIVE 20/ ACTIVE/   PAYS 80%/ OOP 2750 MET .90/ 60 VPCY COMB/ 0 AUTH/ PRESERVICE AUTOMATED SYSTEM/ 20 PAG     Precautions/ Contra-indications: none    C-SSRS Triggered by Intake questionnaire (Past 2 wk assessment):   [x] No, Questionnaire did not trigger screening.   [] Yes, Patient intake triggered further evaluation      [] C-SSRS Screening completed  [] PCP notified via Plan of Care  [] Emergency services notified     Latex Allergy:  [x]NO      []YES  Preferred Language for Healthcare:   [x]English       []other:    SUBJECTIVE: Patient stated complaint: Pt has had multiple R knee surgeries in the past. Recent injury as 9/16/20. Last surgery was 20 years ago. C/O: pain (posterior/lateral), popping, catching, buckling (hyperext), stiffness. Relevant Medical History: R ACL-R; MCL-R  Functional Disability Index:  LEFS 60% deficit    Pain Scale: 4/10  Easing factors: rest, activity modification  Provocative factors: CKC flexion - squatting; kneeling, stairs (descending worse); walking (>1/2 mile), standing, running. 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 Living Status/Prior Level of Function: Independent with ADLs and IADLs, work, rec activities. OBJECTIVE:      ROM PROM AROM Overpressure Comment    L R L R L R    Flexion 140 130        Extension 0 -5  0                              Strength L R Comment   Quad 5 5    Hamstring 5 4    Gastroc NT NT    Hip  flexion 5 5    Hip abd 5 5                    Special Test Results/Comment   Homans NPV   Temp Neg       Girth L R   Mid Patella 40.4 41.0   Suprapatellar 41.8 41.2   5cm above 47.5 45.3   15cm above       Reflexes/Sensation:    [x]Dermatomes/Myotomes intact    []Reflexes equal and normal bilaterally   []Other:    Joint mobility:     [x]Normal    []Hypo   []Hyper    Palpation: LJL    Functional Mobility/Transfers: mod ind    Posture: good    Bandages/Dressings/Incisions: n/a    Gait: (include devices/WB status) WNL    Orthopedic Special Tests: deferred d/t upcoming surgery                       [x] Patient history, allergies, meds reviewed. Medical chart reviewed. See intake form. Review Of Systems (ROS):  [x]Performed Review of systems (Integumentary, CardioPulmonary, Neurological) by intake and observation. Intake form has been scanned into medical record.  Patient has been instructed to contact their primary care physician regarding ROS issues if not already being addressed at this time. Co-morbidities/Complexities (which will affect course of rehabilitation):   []None           Arthritic conditions   []Rheumatoid arthritis (M05.9)  []Osteoarthritis (M19.91)   Cardiovascular conditions   []Hypertension (I10)  []Hyperlipidemia (E78.5)  []Angina pectoris (I20)  []Atherosclerosis (I70)   Musculoskeletal conditions   []Disc pathology   []Congenital spine pathologies   [x]Prior surgical intervention  []Osteoporosis (M81.8)  []Osteopenia (M85.8)   Endocrine conditions   []Hypothyroid (E03.9)  []Hyperthyroid Gastrointestinal conditions   []Constipation (C39.46)   Metabolic conditions   []Morbid obesity (E66.01)  []Diabetes type 1(E10.65) or 2 (E11.65)   []Neuropathy (G60.9)     Pulmonary conditions   []Asthma (J45)  []Coughing   []COPD (J44.9)   Psychological Disorders  []Anxiety (F41.9)  []Depression (F32.9)   []Other:   []Other:          Barriers to/and or personal factors that will affect rehab potential:              []Age  []Sex              []Motivation/Lack of Motivation                        []Co-Morbidities              []Cognitive Function, education/learning barriers              []Environmental, home barriers              []profession/work barriers  []past PT/medical experience  []other:  Justification:     Falls Risk Assessment (30 days):   [x] Falls Risk assessed and no intervention required.   [] Falls Risk assessed and Patient requires intervention due to being higher risk   TUG score (>12s at risk):     [] Falls education provided, including         ASSESSMENT:   Functional Impairments:     []Noted lumbar/proximal hip/LE hypomobility   []Decreased LE functional ROM   [x]Decreased core/proximal hip strength and neuromuscular control   [x]Decreased LE functional strength   [x]Reduced balance/proprioceptive control   []other:      Functional Activity Limitations (from functional questionnaire and intake)   [x]Reduced ability to tolerate prolonged functional positions   [x]Reduced ability or difficulty with changes of positions or transfers between positions   [x]Reduced ability to maintain good posture and demonstrate good body mechanics with sitting, bending, and lifting   []Reduced ability to sleep   [] Reduced ability or tolerance with driving and/or computer work   [x]Reduced ability to perform lifting, carrying tasks   [x]Reduced ability to squat   []Reduced ability to forward bend   [x]Reduced ability to ambulate prolonged functional periods/distances/surfaces   [x]Reduced ability to ascend/descend stairs   [x]Reduced ability to run, hop or jump   []other:     Participation Restrictions   []Reduced participation in self care activities   [x]Reduced participation in home management activities   [x]Reduced participation in work activities   [x]Reduced participation in social activities. [x]Reduced participation in sport activities. Classification :    []Signs/symptoms consistent with post-surgical status including decreased ROM, strength and function.    []Signs/symptoms consistent with joint sprain/strain   []Signs/symptoms consistent with patella-femoral syndrome   []Signs/symptoms consistent with knee OA/hip OA   [x]Signs/symptoms consistent with internal derangement of knee/Hip   []Signs/symptoms consistent with functional hip weakness/NMR control      []Signs/symptoms consistent with tendinitis/tendinosis    []signs/symptoms consistent with pathology which may benefit from Dry needling      []other:      Prognosis/Rehab Potential:      []Excellent   [x]Good    []Fair   []Poor    Tolerance of evaluation/treatment:    []Excellent   [x]Good    []Fair   []Poor    Physical Therapy Evaluation Complexity Justification  [x] A history of present problem with:  [] no personal factors and/or comorbidities that impact the plan of care;  [x]1-2 personal factors and/or comorbidities that impact the plan of care  []3 personal factors and/or comorbidities that impact the plan of care  [x] An examination of body systems using standardized tests and measures addressing any of the following: body structures and functions (impairments), activity limitations, and/or participation restrictions;:  [] a total of 1-2 or more elements   [x] a total of 3 or more elements   [] a total of 4 or more elements   [x] A clinical presentation with:  [] stable and/or uncomplicated characteristics   [x] evolving clinical presentation with changing characteristics  [] unstable and unpredictable characteristics;   [x] Clinical decision making of [] low, [x] moderate, [] high complexity using standardized patient assessment instrument and/or measurable assessment of functional outcome. [] EVAL (LOW) 25337 (typically 20 minutes face-to-face)  [x] EVAL (MOD) 60763 (typically 30 minutes face-to-face)  [] EVAL (HIGH) 62607 (typically 45 minutes face-to-face)  [] RE-EVAL       PLAN  Frequency/Duration:  1-2 days per week for 12 Weeks:  Interventions:  [x]  Therapeutic exercise including: strength training, ROM, for Lower extremity and core   [x]  NMR activation and proprioception for LE, Glutes and Core   [x]  Manual therapy as indicated for LE, Hip and spine to include: Dry Needling/IASTM, STM, PROM, Gr I-IV mobilizations, manipulation. [x] Modalities as needed that may include: thermal agents, E-stim, Biofeedback, US, iontophoresis as indicated  [x] Patient education on joint protection, postural re-education, activity modification, progression of HEP. HEP instruction:   Access Code: [email or web address]   URL: [email or web address]   Date: 11/12/2020   Prepared by: Lamont Hurtado     Exercises  Seated Table Hamstring Stretch - 5 reps - 1 sets - 30 hold - 3x daily - 7x weekly  Long Sitting Calf Stretch with Strap - 5 reps - 1 sets - 30 hold - 3x daily - 7x weekly  Long Sitting Quad Set - 10 reps - 1 sets - 10 hold - 12x daily - 7x weekly  Long Sitting Isometric Hip Adduction and Extension with Ball at Knees - 10 reps - 1 sets - 10 hold - 3x daily - 7x weekly  Supine Straight Leg Raises - 10 reps - 3 sets - 3x daily - 7x weekly   Sidelying Hip Abduction - 10 reps - 3 sets - 3x daily - 7x weekly  Supine Ankle Pumps - 10 reps - 3 sets - 12x daily - 7x weekly  Supine Knee Extension Stretch on Towel Roll - 1 reps - 10 min hold - 3-6x daily - 7x weekly  Seated Active Assistive Knee Flexion and Extension - 10 reps - 1 sets - 10 sec hold - 3-6x daily - 7x weekly  Sitting Heel Slide with Towel - 10 reps - 1 sets - 10 sec hold - 3-6x daily - 7x weekly    GOALS:   Patient stated goal: Return to PLOF without restriction. [] Progressing: [] Met: [] Not Met: [] Adjusted    Therapist goals for Patient:   Short Term Goals: To be achieved in: 2 weeks  1. Independent in HEP and progression per patient tolerance, in order to prevent re-injury. [] Progressing: [] Met: [] Not Met: [] Adjusted   2. Patient will have a decrease in pain to facilitate improvement in movement, function, and ADLs as indicated by Functional Deficits. [] Progressing: [] Met: [] Not Met: [] Adjusted    Long Term Goals: To be achieved in: 12 weeks  1. Disability index score of 15% or less for the LEFS to assist with reaching prior level of function. [] Progressing: [] Met: [] Not Met: [] Adjusted  2. Patient will demonstrate increased AROM to 0-140 deg to allow for proper joint functioning as indicated by patients Functional Deficits. [] Progressing: [] Met: [] Not Met: [] Adjusted  3. Patient will demonstrate an increase in Strength to good proximal hip strength and control, within 5lb HHD in LE to allow for proper functional mobility as indicated by patients Functional Deficits. [] Progressing: [] Met: [] Not Met: [] Adjusted  4. Patient will return to household functional activities without increased symptoms or restriction. [] Progressing: [] Met: [] Not Met: [] Adjusted  5. Pt will be able to walk for at least 20 min without symptoms present. (patient specific functional goal)    [] Progressing: [] Met: [] Not Met: [] Adjusted       Electronically signed by:  Franck Urias PT, DPT    Note: If patient does not return for scheduled/ recommended follow up visits, this note will serve as a discharge from care along with most recent update on progress.

## 2020-11-13 ENCOUNTER — ANESTHESIA EVENT (OUTPATIENT)
Dept: OPERATING ROOM | Age: 40
End: 2020-11-13
Payer: COMMERCIAL

## 2020-11-16 ENCOUNTER — ANESTHESIA (OUTPATIENT)
Dept: OPERATING ROOM | Age: 40
End: 2020-11-16
Payer: COMMERCIAL

## 2020-11-16 ENCOUNTER — HOSPITAL ENCOUNTER (OUTPATIENT)
Age: 40
Setting detail: OUTPATIENT SURGERY
Discharge: HOME | End: 2020-11-16
Attending: ORTHOPAEDIC SURGERY | Admitting: ORTHOPAEDIC SURGERY
Payer: COMMERCIAL

## 2020-11-16 VITALS
DIASTOLIC BLOOD PRESSURE: 54 MMHG | SYSTOLIC BLOOD PRESSURE: 105 MMHG | TEMPERATURE: 96.6 F | RESPIRATION RATE: 11 BRPM | OXYGEN SATURATION: 99 %

## 2020-11-16 VITALS
WEIGHT: 199 LBS | OXYGEN SATURATION: 98 % | HEIGHT: 72 IN | TEMPERATURE: 97 F | RESPIRATION RATE: 16 BRPM | HEART RATE: 80 BPM | BODY MASS INDEX: 26.95 KG/M2 | DIASTOLIC BLOOD PRESSURE: 72 MMHG | SYSTOLIC BLOOD PRESSURE: 120 MMHG

## 2020-11-16 PROCEDURE — 6360000002 HC RX W HCPCS: Performed by: ORTHOPAEDIC SURGERY

## 2020-11-16 PROCEDURE — 6360000002 HC RX W HCPCS: Performed by: NURSE ANESTHETIST, CERTIFIED REGISTERED

## 2020-11-16 PROCEDURE — 3700000001 HC ADD 15 MINUTES (ANESTHESIA): Performed by: ORTHOPAEDIC SURGERY

## 2020-11-16 PROCEDURE — 2720000010 HC SURG SUPPLY STERILE: Performed by: ORTHOPAEDIC SURGERY

## 2020-11-16 PROCEDURE — 7100000000 HC PACU RECOVERY - FIRST 15 MIN: Performed by: ORTHOPAEDIC SURGERY

## 2020-11-16 PROCEDURE — 7100000001 HC PACU RECOVERY - ADDTL 15 MIN: Performed by: ORTHOPAEDIC SURGERY

## 2020-11-16 PROCEDURE — 7100000010 HC PHASE II RECOVERY - FIRST 15 MIN: Performed by: ORTHOPAEDIC SURGERY

## 2020-11-16 PROCEDURE — 2709999900 HC NON-CHARGEABLE SUPPLY: Performed by: ORTHOPAEDIC SURGERY

## 2020-11-16 PROCEDURE — 7100000011 HC PHASE II RECOVERY - ADDTL 15 MIN: Performed by: ORTHOPAEDIC SURGERY

## 2020-11-16 PROCEDURE — 3600000004 HC SURGERY LEVEL 4 BASE: Performed by: ORTHOPAEDIC SURGERY

## 2020-11-16 PROCEDURE — 2580000003 HC RX 258: Performed by: ANESTHESIOLOGY

## 2020-11-16 PROCEDURE — 3700000000 HC ANESTHESIA ATTENDED CARE: Performed by: ORTHOPAEDIC SURGERY

## 2020-11-16 PROCEDURE — 3600000014 HC SURGERY LEVEL 4 ADDTL 15MIN: Performed by: ORTHOPAEDIC SURGERY

## 2020-11-16 RX ORDER — KETOROLAC TROMETHAMINE 30 MG/ML
INJECTION, SOLUTION INTRAMUSCULAR; INTRAVENOUS PRN
Status: DISCONTINUED | OUTPATIENT
Start: 2020-11-16 | End: 2020-11-16 | Stop reason: SDUPTHER

## 2020-11-16 RX ORDER — ASPIRIN 81 MG/1
81 TABLET ORAL 2 TIMES DAILY
Qty: 28 TABLET | Refills: 0 | Status: SHIPPED | OUTPATIENT
Start: 2020-11-16 | End: 2020-11-16 | Stop reason: SDUPTHER

## 2020-11-16 RX ORDER — MIDAZOLAM HYDROCHLORIDE 1 MG/ML
INJECTION INTRAMUSCULAR; INTRAVENOUS PRN
Status: DISCONTINUED | OUTPATIENT
Start: 2020-11-16 | End: 2020-11-16 | Stop reason: SDUPTHER

## 2020-11-16 RX ORDER — CEFAZOLIN SODIUM 2 G/50ML
2 SOLUTION INTRAVENOUS ONCE
Status: COMPLETED | OUTPATIENT
Start: 2020-11-16 | End: 2020-11-16

## 2020-11-16 RX ORDER — ONDANSETRON 2 MG/ML
INJECTION INTRAMUSCULAR; INTRAVENOUS PRN
Status: DISCONTINUED | OUTPATIENT
Start: 2020-11-16 | End: 2020-11-16 | Stop reason: SDUPTHER

## 2020-11-16 RX ORDER — FENTANYL CITRATE 50 UG/ML
INJECTION, SOLUTION INTRAMUSCULAR; INTRAVENOUS PRN
Status: DISCONTINUED | OUTPATIENT
Start: 2020-11-16 | End: 2020-11-16 | Stop reason: SDUPTHER

## 2020-11-16 RX ORDER — ONDANSETRON 4 MG/1
4 TABLET, FILM COATED ORAL EVERY 8 HOURS PRN
Qty: 30 TABLET | Refills: 0 | Status: SHIPPED | OUTPATIENT
Start: 2020-11-16 | End: 2021-12-16 | Stop reason: ALTCHOICE

## 2020-11-16 RX ORDER — PROPOFOL 10 MG/ML
INJECTION, EMULSION INTRAVENOUS PRN
Status: DISCONTINUED | OUTPATIENT
Start: 2020-11-16 | End: 2020-11-16 | Stop reason: SDUPTHER

## 2020-11-16 RX ORDER — ROPIVACAINE HYDROCHLORIDE 5 MG/ML
INJECTION, SOLUTION EPIDURAL; INFILTRATION; PERINEURAL PRN
Status: DISCONTINUED | OUTPATIENT
Start: 2020-11-16 | End: 2020-11-16 | Stop reason: ALTCHOICE

## 2020-11-16 RX ORDER — AMOXICILLIN 250 MG
2 CAPSULE ORAL DAILY PRN
Qty: 30 TABLET | Refills: 0 | Status: SHIPPED | OUTPATIENT
Start: 2020-11-16 | End: 2021-12-16 | Stop reason: ALTCHOICE

## 2020-11-16 RX ORDER — ASPIRIN 81 MG/1
81 TABLET ORAL 2 TIMES DAILY
Qty: 28 TABLET | Refills: 0 | Status: SHIPPED | OUTPATIENT
Start: 2020-11-16 | End: 2021-12-16 | Stop reason: ALTCHOICE

## 2020-11-16 RX ORDER — SODIUM CHLORIDE, SODIUM LACTATE, POTASSIUM CHLORIDE, CALCIUM CHLORIDE 600; 310; 30; 20 MG/100ML; MG/100ML; MG/100ML; MG/100ML
INJECTION, SOLUTION INTRAVENOUS CONTINUOUS
Status: DISCONTINUED | OUTPATIENT
Start: 2020-11-16 | End: 2020-11-16 | Stop reason: HOSPADM

## 2020-11-16 RX ORDER — LIDOCAINE HYDROCHLORIDE 20 MG/ML
INJECTION, SOLUTION INTRAVENOUS PRN
Status: DISCONTINUED | OUTPATIENT
Start: 2020-11-16 | End: 2020-11-16 | Stop reason: SDUPTHER

## 2020-11-16 RX ORDER — AMOXICILLIN 250 MG
2 CAPSULE ORAL DAILY PRN
Qty: 30 TABLET | Refills: 0 | Status: SHIPPED | OUTPATIENT
Start: 2020-11-16 | End: 2020-11-16 | Stop reason: SDUPTHER

## 2020-11-16 RX ORDER — HYDROCODONE BITARTRATE AND ACETAMINOPHEN 5; 325 MG/1; MG/1
1-2 TABLET ORAL EVERY 4 HOURS PRN
Qty: 42 TABLET | Refills: 0 | Status: SHIPPED | OUTPATIENT
Start: 2020-11-16 | End: 2020-11-23

## 2020-11-16 RX ORDER — ONDANSETRON 4 MG/1
4 TABLET, FILM COATED ORAL EVERY 8 HOURS PRN
Qty: 30 TABLET | Refills: 0 | Status: SHIPPED | OUTPATIENT
Start: 2020-11-16 | End: 2020-11-16 | Stop reason: SDUPTHER

## 2020-11-16 RX ORDER — DEXAMETHASONE SODIUM PHOSPHATE 4 MG/ML
INJECTION, SOLUTION INTRA-ARTICULAR; INTRALESIONAL; INTRAMUSCULAR; INTRAVENOUS; SOFT TISSUE PRN
Status: DISCONTINUED | OUTPATIENT
Start: 2020-11-16 | End: 2020-11-16 | Stop reason: SDUPTHER

## 2020-11-16 RX ORDER — DIPHENHYDRAMINE HYDROCHLORIDE 50 MG/ML
INJECTION INTRAMUSCULAR; INTRAVENOUS PRN
Status: DISCONTINUED | OUTPATIENT
Start: 2020-11-16 | End: 2020-11-16 | Stop reason: SDUPTHER

## 2020-11-16 RX ORDER — HYDROCODONE BITARTRATE AND ACETAMINOPHEN 5; 325 MG/1; MG/1
1-2 TABLET ORAL EVERY 4 HOURS PRN
Qty: 42 TABLET | Refills: 0 | Status: SHIPPED | OUTPATIENT
Start: 2020-11-16 | End: 2020-11-16 | Stop reason: SDUPTHER

## 2020-11-16 RX ADMIN — SODIUM CHLORIDE, SODIUM LACTATE, POTASSIUM CHLORIDE, AND CALCIUM CHLORIDE: 600; 310; 30; 20 INJECTION, SOLUTION INTRAVENOUS at 09:47

## 2020-11-16 RX ADMIN — PROPOFOL 50 MG: 10 INJECTION, EMULSION INTRAVENOUS at 10:42

## 2020-11-16 RX ADMIN — MIDAZOLAM HYDROCHLORIDE 2 MG: 2 INJECTION, SOLUTION INTRAMUSCULAR; INTRAVENOUS at 10:31

## 2020-11-16 RX ADMIN — FENTANYL CITRATE 100 MCG: 50 INJECTION INTRAMUSCULAR; INTRAVENOUS at 10:37

## 2020-11-16 RX ADMIN — SODIUM CHLORIDE, SODIUM LACTATE, POTASSIUM CHLORIDE, AND CALCIUM CHLORIDE: 600; 310; 30; 20 INJECTION, SOLUTION INTRAVENOUS at 11:05

## 2020-11-16 RX ADMIN — PROPOFOL 300 MG: 10 INJECTION, EMULSION INTRAVENOUS at 10:37

## 2020-11-16 RX ADMIN — FENTANYL CITRATE 50 MCG: 50 INJECTION INTRAMUSCULAR; INTRAVENOUS at 11:00

## 2020-11-16 RX ADMIN — FENTANYL CITRATE 50 MCG: 50 INJECTION INTRAMUSCULAR; INTRAVENOUS at 11:23

## 2020-11-16 RX ADMIN — DIPHENHYDRAMINE HYDROCHLORIDE 12.5 MG: 50 INJECTION, SOLUTION INTRAMUSCULAR; INTRAVENOUS at 11:02

## 2020-11-16 RX ADMIN — KETOROLAC TROMETHAMINE 30 MG: 30 INJECTION, SOLUTION INTRAMUSCULAR; INTRAVENOUS at 10:42

## 2020-11-16 RX ADMIN — LIDOCAINE HYDROCHLORIDE 100 MG: 20 INJECTION, SOLUTION INTRAVENOUS at 10:37

## 2020-11-16 RX ADMIN — DEXAMETHASONE SODIUM PHOSPHATE 8 MG: 4 INJECTION, SOLUTION INTRAMUSCULAR; INTRAVENOUS at 10:37

## 2020-11-16 RX ADMIN — ONDANSETRON 4 MG: 2 INJECTION INTRAMUSCULAR; INTRAVENOUS at 10:37

## 2020-11-16 RX ADMIN — CEFAZOLIN SODIUM 2 G: 2 SOLUTION INTRAVENOUS at 10:43

## 2020-11-16 ASSESSMENT — PULMONARY FUNCTION TESTS
PIF_VALUE: 14
PIF_VALUE: 13
PIF_VALUE: 3
PIF_VALUE: 0
PIF_VALUE: 4
PIF_VALUE: 0
PIF_VALUE: 12
PIF_VALUE: 3
PIF_VALUE: 14
PIF_VALUE: 4
PIF_VALUE: 5
PIF_VALUE: 10
PIF_VALUE: 10
PIF_VALUE: 12
PIF_VALUE: 13
PIF_VALUE: 12
PIF_VALUE: 12
PIF_VALUE: 4
PIF_VALUE: 13
PIF_VALUE: 3
PIF_VALUE: 12
PIF_VALUE: 15
PIF_VALUE: 13
PIF_VALUE: 12
PIF_VALUE: 2
PIF_VALUE: 3
PIF_VALUE: 12
PIF_VALUE: 13
PIF_VALUE: 3
PIF_VALUE: 13
PIF_VALUE: 10
PIF_VALUE: 4
PIF_VALUE: 4
PIF_VALUE: 1
PIF_VALUE: 3
PIF_VALUE: 4
PIF_VALUE: 12
PIF_VALUE: 5
PIF_VALUE: 3
PIF_VALUE: 12
PIF_VALUE: 4
PIF_VALUE: 14
PIF_VALUE: 4
PIF_VALUE: 12
PIF_VALUE: 14
PIF_VALUE: 4
PIF_VALUE: 13
PIF_VALUE: 5
PIF_VALUE: 5
PIF_VALUE: 13
PIF_VALUE: 3
PIF_VALUE: 4
PIF_VALUE: 1
PIF_VALUE: 13
PIF_VALUE: 1
PIF_VALUE: 13
PIF_VALUE: 3
PIF_VALUE: 3
PIF_VALUE: 13
PIF_VALUE: 4
PIF_VALUE: 5
PIF_VALUE: 12
PIF_VALUE: 12
PIF_VALUE: 3
PIF_VALUE: 13
PIF_VALUE: 12
PIF_VALUE: 12
PIF_VALUE: 4
PIF_VALUE: 5
PIF_VALUE: 2
PIF_VALUE: 4
PIF_VALUE: 12
PIF_VALUE: 2
PIF_VALUE: 0

## 2020-11-16 ASSESSMENT — PAIN SCALES - GENERAL
PAINLEVEL_OUTOF10: 0

## 2020-11-16 ASSESSMENT — PAIN - FUNCTIONAL ASSESSMENT: PAIN_FUNCTIONAL_ASSESSMENT: 0-10

## 2020-11-16 NOTE — H&P
Zeny Alyx    [id number]    [facility] Same Day Surgery Update H & P  Department of General Surgery   Surgical Service   Pre-operative History and Physical  Last H & P within the last 30 days. DIAGNOSIS:   Tear of lateral meniscus of right knee, unspecified tear type, unspecified whether old or current tear, initial encounter [S83.281A]    Procedure(s):  RIGHT KNEE SCOPE LATERAL MENISCUS REPAIR / EXCISION     HISTORY OF PRESENT ILLNESS:   Patient with right knee pain, swelling and instability in the setting of lateral meniscus tear. The symptoms have been recalcitrant to conservative treatment and the patient presents today for the above procedure. Covid 19:  Patient denies fever, chills, cough or known exposure to Covid-19.       Past Medical History:        Diagnosis Date    Arthritis      Past Surgical History:        Procedure Laterality Date    KNEE SURGERY       Past Social History:  Social History     Socioeconomic History    Marital status:      Spouse name: None    Number of children: None    Years of education: None    Highest education level: None   Occupational History    None   Social Needs    Financial resource strain: None    Food insecurity     Worry: None     Inability: None    Transportation needs     Medical: None     Non-medical: None   Tobacco Use    Smoking status: Never Smoker    Smokeless tobacco: Never Used   Substance and Sexual Activity    Alcohol use: Never     Frequency: Never    Drug use: Never    Sexual activity: None   Lifestyle    Physical activity     Days per week: None     Minutes per session: None    Stress: None   Relationships    Social connections     Talks on phone: None     Gets together: None     Attends Yazidism service: None     Active member of club or organization: None     Attends meetings of clubs or organizations: None     Relationship status: None    Intimate partner violence     Fear of current or ex partner: None Emotionally abused: None     Physically abused: None     Forced sexual activity: None   Other Topics Concern    None   Social History Narrative    None         Medications Prior to Admission:      Prior to Admission medications    Medication Sig Start Date End Date Taking? Authorizing Provider   Multiple Vitamins-Minerals (THERAPEUTIC MULTIVITAMIN-MINERALS) tablet Take 1 tablet by mouth daily   Yes Historical Provider, MD         Allergies:  Patient has no known allergies. PHYSICAL EXAM:      /70   Pulse 67   Temp 98.1 °F (36.7 °C) (Oral)   Resp 16   Ht 6' (1.829 m)   Wt 199 lb (90.3 kg)   SpO2 100%   BMI 26.99 kg/m²      Airway:  Airway patent with no audible stridor    Heart:  Regular rate and rhythm, No murmur noted    Lungs:  No increased work of breathing, good air exchange, clear to auscultation bilaterally, no crackles or wheezing    Abdomen:  Soft, non-distended, non-tender, normal active bowel sounds, no masses palpated    ASSESSMENT AND PLAN    Patient is a 41 y.o. male with above specified procedure planned. 1.  Patient seen and focused exam done today- no new changes since last physical exam on 11/12/20    2. Access to ancillary services are available per request of the provider.     HAKEEM Patel - CNP     11/16/2020

## 2020-11-16 NOTE — BRIEF OP NOTE
Brief Postoperative Note      Patient: Dejuan Nguyễn  YOB: 1980  MRN: [id number]    Date of Procedure: 11/16/2020    Pre-Op Diagnosis: Tear of lateral meniscus of right knee, unspecified tear type, unspecified whether old or current tear, initial encounter [S83.281A]    Post-Op Diagnosis: Same       Procedure(s):  RIGHT KNEE ARTHROSCOPY, LATERAL MENISCUS EXCISION - partial meniscectomy lateral     Surgeon(s):  MD Preston Domínguez DO    Assistant:  Surgical Assistant: Odell Saunders  Physician Assistant: Lucy Lazcano PA-C    Anesthesia: General    Estimated Blood Loss (mL): Minimal    Complications: None    Specimens:   * No specimens in log *    Implants:  * No implants in log *      Drains: * No LDAs found *    Findings: refer to formal operative report    Electronically signed by Chase Dixon MD on 11/16/2020 at 11:23 AM

## 2020-11-16 NOTE — PROGRESS NOTES
Ambulatory Surgery/Procedure Discharge Note    Vitals:    11/16/20 1230   BP: 120/72   Pulse: 80   Resp: 16   Temp: 97 °F (36.1 °C)   SpO2: 98%       In: 1255 [P.O.:200; I.V.:1055]  Out: 10     Restroom use offered before discharge. Yes, denies need    Pain assessment:  none  Pain Level: 0      Ace wrap dry and intact with ice pack. Toes pink and warm. Denies complaints  Patient discharged to home/self care.  Patient discharged via wheel chair by transporter to waiting family/S.O.       11/16/2020 1:00 PM

## 2020-11-16 NOTE — ANESTHESIA PRE PROCEDURE
Department of Anesthesiology  Preprocedure Note       Name:  Fiordaliza Ford   Age:  41 y.o.  :  1980                                          MRN:  [id number]         Date:  2020      Surgeon: Olegario Loyola):  MD Samara Wells MD    Procedure: Procedure(s):  RIGHT KNEE SCOPE LATERAL MENISCUS REPAIR / EXCISION    Medications prior to admission:   Prior to Admission medications    Medication Sig Start Date End Date Taking? Authorizing Provider   Multiple Vitamins-Minerals (THERAPEUTIC MULTIVITAMIN-MINERALS) tablet Take 1 tablet by mouth daily   Yes Historical Provider, MD       Current medications:    Current Facility-Administered Medications   Medication Dose Route Frequency Provider Last Rate Last Dose    ceFAZolin (ANCEF) 2 g in dextrose 3 % 50 mL IVPB (duplex)  2 g Intravenous Once Eduardo Reno MD        lactated ringers infusion   Intravenous Continuous Miim Mcfadden DO           Allergies:     Allergies   Allergen Reactions    Meperidine Hives    Morphine Hives       Problem List:    Patient Active Problem List   Diagnosis Code    AR (allergic rhinitis) J30.9    Foraminal stenosis of cervical region M48.02    Hematuria of undiagnosed cause R31.9       Past Medical History:        Diagnosis Date    Arthritis        Past Surgical History:        Procedure Laterality Date    KNEE SURGERY         Social History:    Social History     Tobacco Use    Smoking status: Never Smoker    Smokeless tobacco: Never Used   Substance Use Topics    Alcohol use: Never     Frequency: Never                                Counseling given: Not Answered      Vital Signs (Current):   Vitals:    20 1452 20 0901   BP:  118/70   Pulse:  67   Resp:  16   Temp:  98.1 °F (36.7 °C)   TempSrc:  Oral   SpO2:  100%   Weight: 199 lb (90.3 kg) 199 lb (90.3 kg)   Height: 6' (1.829 m) 6' (1.829 m)                                              BP Readings from Last 3 Encounters: 11/16/20 118/70   04/12/19 126/70   03/23/17 133/84       NPO Status: Time of last liquid consumption: 2330                        Time of last solid consumption: 2130                        Date of last liquid consumption: 11/15/20                        Date of last solid food consumption: 11/15/20    BMI:   Wt Readings from Last 3 Encounters:   11/16/20 199 lb (90.3 kg)   11/12/20 199 lb 15.3 oz (90.7 kg)   10/15/20 199 lb 15.3 oz (90.7 kg)     Body mass index is 26.99 kg/m². CBC: No results found for: WBC, RBC, HGB, HCT, MCV, RDW, PLT    CMP: No results found for: NA, K, CL, CO2, BUN, CREATININE, GFRAA, AGRATIO, LABGLOM, GLUCOSE, PROT, CALCIUM, BILITOT, ALKPHOS, AST, ALT    POC Tests: No results for input(s): POCGLU, POCNA, POCK, POCCL, POCBUN, POCHEMO, POCHCT in the last 72 hours. Coags: No results found for: PROTIME, INR, APTT    HCG (If Applicable): No results found for: PREGTESTUR, PREGSERUM, HCG, HCGQUANT     ABGs: No results found for: PHART, PO2ART, EQN1CAV, ZXM2QAG, BEART, [id number]     Type & Screen (If Applicable):  No results found for: LABABO, LABRH    Drug/Infectious Status (If Applicable):  No results found for: HIV, HEPCAB    COVID-19 Screening (If Applicable):   Lab Results   Component Value Date    COVID19 Not Detected 11/11/2020         Anesthesia Evaluation  Patient summary reviewed and Nursing notes reviewed no history of anesthetic complications:   Airway: Mallampati: I  TM distance: >3 FB   Neck ROM: full  Mouth opening: > = 3 FB Dental: normal exam         Pulmonary:Negative Pulmonary ROS                              Cardiovascular:Negative CV ROS            Rhythm: regular  Rate: normal                    Neuro/Psych:   Negative Neuro/Psych ROS              GI/Hepatic/Renal: Neg GI/Hepatic/Renal ROS            Endo/Other:    (+) : arthritis: OA., .                 Abdominal:           Vascular: negative vascular ROS.                                        Anesthesia Plan      general     ASA 1       Induction: intravenous. MIPS: Prophylactic antiemetics administered. Anesthetic plan and risks discussed with patient. Plan discussed with CRNA.                   Victor Manuel Mercado DO   11/16/2020

## 2020-11-16 NOTE — PROGRESS NOTES
Patient admitted to PACU. Post op    Room / Location: [facility] OR  / [facility]    Anesthesia Start: 1031  Anesthesia Stop: 9276    Procedure: RIGHT KNEE ARTHROSCOPY, PARTIAL LATERAL MENISCUS EXCISION (Right )  Diagnosis:        Tear of lateral meniscus of right knee, unspecified tear type, unspecified whether old or current tear, initial encounter       (Tear of lateral meniscus of right knee, unspecified tear type, unspecified whether old or current tear, initial encounter Radha France)    Surgeon: Kg Seals MD  Responsible Provider     Report received from anesthesia personnel- no problems noted during the case. Patient denies any pain or nausea at this time. Right knee with ace wrap intact, ice packs applied. Good peripheral pulses, movement and sensation.

## 2020-11-16 NOTE — ANESTHESIA POSTPROCEDURE EVALUATION
Department of Anesthesiology  Postprocedure Note    Patient: Ness Haywood  MRN: [id number]  YOB: 1980  Date of evaluation: 11/16/2020  Time:  1:36 PM     Procedure Summary     Date:  11/16/20 Room / Location:  [address]    Anesthesia Start:  1031 Anesthesia Stop:  0679    Procedure:  RIGHT KNEE ARTHROSCOPY, PARTIAL LATERAL MENISCUS EXCISION (Right ) Diagnosis:       Tear of lateral meniscus of right knee, unspecified tear type, unspecified whether old or current tear, initial encounter      (Tear of lateral meniscus of right knee, unspecified tear type, unspecified whether old or current tear, initial encounter [S83.281A])    Surgeon:  Moon Green MD Responsible Provider:  Sujit Marshall DO    Anesthesia Type:  general ASA Status:  1          Anesthesia Type: general    Lucia Phase I: Lucia Score: 10    Lucia Phase II: Lucia Score: 10    Last vitals: Reviewed and per EMR flowsheets.        Anesthesia Post Evaluation    Patient location during evaluation: PACU  Patient participation: complete - patient participated  Level of consciousness: awake and alert  Airway patency: patent  Nausea & Vomiting: no nausea and no vomiting  Cardiovascular status: blood pressure returned to baseline  Respiratory status: acceptable  Hydration status: euvolemic

## 2020-11-16 NOTE — ANESTHESIA PRE PROCEDURE
mass index is 26.99 kg/m². CBC: No results found for: WBC, RBC, HGB, HCT, MCV, RDW, PLT    CMP: No results found for: NA, K, CL, CO2, BUN, CREATININE, GFRAA, AGRATIO, LABGLOM, GLUCOSE, PROT, CALCIUM, BILITOT, ALKPHOS, AST, ALT    POC Tests: No results for input(s): POCGLU, POCNA, POCK, POCCL, POCBUN, POCHEMO, POCHCT in the last 72 hours. Coags: No results found for: PROTIME, INR, APTT    HCG (If Applicable): No results found for: PREGTESTUR, PREGSERUM, HCG, HCGQUANT     ABGs: No results found for: PHART, PO2ART, JVV0PDV, YQF8BZP, BEART, W2RZCOXZ     Type & Screen (If Applicable):  No results found for: LABABO, LABRH    Drug/Infectious Status (If Applicable):  No results found for: HIV, HEPCAB    COVID-19 Screening (If Applicable):   Lab Results   Component Value Date    COVID19 Not Detected 11/11/2020         Anesthesia Evaluation  Patient summary reviewed and Nursing notes reviewed no history of anesthetic complications:   Airway: Mallampati: II  TM distance: >3 FB   Neck ROM: full  Mouth opening: > = 3 FB Dental:          Pulmonary:Negative Pulmonary ROS                              Cardiovascular:Negative CV ROS                      Neuro/Psych:   Negative Neuro/Psych ROS              GI/Hepatic/Renal: Neg GI/Hepatic/Renal ROS            Endo/Other: Negative Endo/Other ROS                    Abdominal:           Vascular: negative vascular ROS. Anesthesia Plan      general     ASA 3    (43-year-old male presents for RIGHT KNEE SCOPE LATERAL MENISCUS REPAIR / EXCISION. Plan general anesthesia with ASA standard monitors. Questions answered. Patient agreeable with anesthetic plan.  )  Induction: intravenous. Anesthetic plan and risks discussed with patient. Plan discussed with CRNA.     Attending anesthesiologist reviewed and agrees with Pre Eval content        Rupinder Christopher MD   11/15/2020

## 2020-11-16 NOTE — PROGRESS NOTES
PACU Transfer Note    Vitals:    11/16/20 1221   BP:    Pulse: 80   Resp: 17   Temp:    SpO2: 99%       In: 1255 [P.O.:200; I.V.:1055]  Out: 10     Pain assessment: VS stable. No pain, no nausea. Patient to [facility] bed#5 for discharge. FAmily updated.    Pain Level: 0    Report given to Receiving unit RN.    11/16/2020 12:26 PM

## 2020-11-17 ENCOUNTER — HOSPITAL ENCOUNTER (OUTPATIENT)
Dept: PHYSICAL THERAPY | Age: 40
Setting detail: THERAPIES SERIES
Discharge: HOME | End: 2020-11-17
Payer: COMMERCIAL

## 2020-11-17 PROCEDURE — 97110 THERAPEUTIC EXERCISES: CPT | Performed by: PHYSICAL THERAPIST

## 2020-11-17 PROCEDURE — 97016 VASOPNEUMATIC DEVICE THERAPY: CPT | Performed by: PHYSICAL THERAPIST

## 2020-11-17 PROCEDURE — 97530 THERAPEUTIC ACTIVITIES: CPT | Performed by: PHYSICAL THERAPIST

## 2020-11-17 NOTE — FLOWSHEET NOTE
to review activity progressions, HEP, etc so he can prepare to rehab at home until next MD check-in. OBJECTIVE:   ROM PROM AROM Overpressure Comment     L R L R L R     Flexion 140 105             Extension 0 -3   0                                                   Strength L R Comment   Quad 5 5     Hamstring 5 4     Gastroc NT NT     Hip  flexion 5 5     Hip abd 5 5                               Special Test Results/Comment   Homans NPV   Temp Neg         Girth L R   Mid Patella 40.4 41.0   Suprapatellar 41.8 41.2   5cm above 47.5 45.3   15cm above          Reflexes/Sensation:               [x]? Dermatomes/Myotomes intact               []?Reflexes equal and normal bilaterally              []?Other:     Joint mobility:                [x]? Normal                       []?Hypo              []?Hyper     Palpation: LJL     Functional Mobility/Transfers: mod ind     Posture: good     Bandages/Dressings/Incisions: n/a     Gait: (include devices/WB status) WNL       RESTRICTIONS/PRECAUTIONS: none    Exercises/Interventions:   Exercise/Equipment Resistance/Repetitions Other comments   Stretching     Hamstring 3 x 30\"    Towel Pull 3 x 30\"    Inclined Calf     Hip Flexion     ITB     Groin     Quad                    SLR     Supine 3 x 10    Abduction 3 x 10    Adduction 3 x 10    Prone 3 x 10    SLR+          Isometrics     Quad sets 10 x 10\"    Add sets 10 x 10\"         Patellar Glides     Medial     Superior     Inferior          ROM     Sheet Pulls 10 x 10\"    Hang Weights     Passive     Active     Weight Shift     Ankle Pumps                    CKC     Calf raises     Wall sits     Step ups     1 leg stand     Squatting     CC TKE     Balance     bridges          PRE     Extension  RANGE:   Flexion  RANGE:        Quantum machines     Leg press      Leg extension     Leg curl          Manual interventions          Removal of PO bandages 10'          Therapeutic Exercise and NMR EXR  [x] (90420) Provided verbal/tactile ambulation. Modalities:      Charges:  Timed Code Treatment Minutes: 36'   Total Treatment Minutes: 55'   11:18 - 12:25    [] EVAL (LOW) 29694 (typically 20 minutes face-to-face)  [] EVAL (MOD) 76913 (typically 30 minutes face-to-face)  [] EVAL (HIGH) 11620 (typically 45 minutes face-to-face)  [] RE-EVAL   [x] MG(59959) x 2   [] IONTO  [] NMR (37969) x     [x] VASO  [] Manual (71489) x      [] Other:  [x] TA x 1     [] Mech Traction (84880)  [] ES(attended) (09623)      [] ES (un) (82518):     GOALS:   Patient stated goal: Return to PLOF without restriction. []? Progressing: []? Met: []? Not Met: []? Adjusted     Therapist goals for Patient:   Short Term Goals: To be achieved in: 2 weeks  1. Independent in HEP and progression per patient tolerance, in order to prevent re-injury. []? Progressing: []? Met: []? Not Met: []? Adjusted   2. Patient will have a decrease in pain to facilitate improvement in movement, function, and ADLs as indicated by Functional Deficits. []? Progressing: []? Met: []? Not Met: []? Adjusted     Long Term Goals: To be achieved in: 12 weeks  1. Disability index score of 15% or less for the LEFS to assist with reaching prior level of function. []? Progressing: []? Met: []? Not Met: []? Adjusted  2. Patient will demonstrate increased AROM to 0-140 deg to allow for proper joint functioning as indicated by patients Functional Deficits. []? Progressing: []? Met: []? Not Met: []? Adjusted  3. Patient will demonstrate an increase in Strength to good proximal hip strength and control, within 5lb HHD in LE to allow for proper functional mobility as indicated by patients Functional Deficits. []? Progressing: []? Met: []? Not Met: []? Adjusted  4. Patient will return to household functional activities without increased symptoms or restriction. []? Progressing: []? Met: []? Not Met: []? Adjusted  5.  Pt will be able to walk for at least 20 min without symptoms present. (patient specific functional goal)    []? Progressing: []? Met: []? Not Met: []? Adjusted          Overall Progression Towards Functional goals/ Treatment Progress Update:  [] Patient is progressing as expected towards functional goals listed. [] Progression is slowed due to complexities/Impairments listed. [] Progression has been slowed due to co-morbidities. [x] Plan just implemented, too soon to assess goals progression <30days   [] Goals require adjustment due to lack of progress  [] Patient is not progressing as expected and requires additional follow up with physician  [] Other    Prognosis for POC: [x] Good [] Fair  [] Poor      Patient requires continued skilled intervention: [x] Yes  [] No    Treatment/Activity Tolerance:  [x] Patient able to complete treatment  [] Patient limited by fatigue  [] Patient limited by pain     [] Patient limited by other medical complications  [x] Other:  Pt is doing quite well 1-day PO. Able to demonstrate appropriate gait for short distances without AD. Educated about non-impact cardio alternatives, exercise progressions over the next 2 weeks and giving PT an update of progress in 2 weeks time secondary to pt's desire to rehab from home. Pt had good tolerance to program described above with fair VMO activation. HEP instruction:   Access Code: [email or web address]   URL: [email or web address]   Date: 11/17/2020   Prepared by: Ky Snowden     Exercises  Gastroc Stretch on Wall - 5 reps - 1 sets - 30 sec hold - 1x daily - 7x weekly  Prone Quadriceps Stretch with Strap - 5 reps - 1 sets - 30 sec hold - 1x daily - 7x weekly  Seated Table Hamstring Stretch - 5 reps - 1 sets - 30 hold - 3x daily - 7x weekly  Sitting Heel Slide with Towel - 10 reps - 1 sets - 10 sec hold - 3-6x daily - 7x weekly  Long Sitting Quad Set - 10 reps - 1 sets - 10 hold - 12x daily - 7x weekly  Supine Straight Leg Raises - 10 reps - 3 sets - 3x daily - 7x weekly   Sidelying Hip Abduction - 10 reps - 3 sets - 3x daily - 7x weekly  Sidelying Hip Adduction - 10 reps - 3 sets - 1x daily - 7x weekly  Prone Hip Extension - 10 reps - 3 sets - 1x daily - 7x weekly  Prone Hamstring Curl with Ankle Weight - 10 reps - 3 sets - 1x daily - 7x weekly  Supine Bridge - 10 reps - 3 sets - 1x daily - 7x weekly  Hooklying Clamshell with Resistance - 10 reps - 3 sets - 1x daily - 7x weekly  Clamshell with Resistance - 10 reps - 3 sets - 1x daily - 7x weekly  Heel rises with counter support - 10 reps - 3 sets - 1x daily - 7x weekly  Wall Quarter Squat - 3 sets - To muscle fatigue hold - 1x daily - 7x weekly  Seated Long Arc Quad with Ankle Weight - 10 reps - 3 sets - 1x daily - 7x weekly       PLAN: See nelson Pt to send an email update in 2 weeks then return for PT/MD follow-up in 4 weeks. [] Continue per plan of care [] Alter current plan (see comments above)  [x] Plan of care initiated [] Hold pending MD visit [] Discharge      Electronically signed by:  Gen Mckinney, PT, DPT    Note: If patient does not return for scheduled/ recommended follow up visits, this note will serve as a discharge from care along with most recent update on progress.

## 2020-11-17 NOTE — OP NOTE
[address], [address]                                OPERATIVE REPORT    PATIENT NAME: Shahid Garner                     :        1980  MED REC NO:   [id number]                          ROOM:  ACCOUNT NO:   [de-identified]                           ADMIT DATE: 2020  PROVIDER:     Abigail Ng MD    DATE OF PROCEDURE:  2020    PREOPERATIVE DIAGNOSES:  Status post ACL reconstruction, right knee,  with new lateral meniscus complex tear. POSTOPERATIVE DIAGNOSES:  Status post ACL reconstruction, right knee,  with new lateral meniscus complex tear. OPERATIVE PROCEDURE:  Arthroscopically assisted partial lateral  meniscectomy. SURGEON:  Abigail Ng MD    FIRST ASSISTANT:  Dr. Jackie Robledo. ANESTHESIA:  General.    OPERATIVE INDICATIONS:  This patient understood the associated risks,  benefits and consented to the operative procedure having undergone a  prior successful anterior cruciate reconstruction. OPERATIVE FINDINGS:  Complex lateral meniscus tear with a predominant  flap tear, that was not repairable. This was resected. 50% of the  meniscus has been saved. There still is some a horizontal cleavage  tears in the mid horn region where the meniscus still does have some  deterioration but was left intact as it still is functional.  There is  very mild two-way articular cartilage changes on the weightbearing  surface of the lateral tibial plateau. This patient will be advised to  diminish impact activities as the lateral compartment over ensuing years  may represent a symptomatic issue. Overall, the operative procedure  went well and there were no complications.     OPERATIVE PROCEDURE:  This patient was placed in supine position upon  the operating room table and after satisfactory level of general  anesthesia, the right knee was prepped and draped to a sterile surgical  field after identification of the signed limb and the timeout procedure. Under tourniquet control and with the inferior lateral portal used for  the operative portal.  Superior portal was used for fluid egress. A  diagnostic arthroscopy has been performed. There is mild two-way  articular cartilage changes on the undersurface of the patella, which  were noted and simply left alone. This does indicate some early wear  and tear to the patellofemoral joint. Medial and lateral compartments intact. Medial meniscus and medial  compartment intact. Both cruciate ligaments intact. A very excellent  stout ACL almost two times than normal size was present and this is  certainly functional with a negative pivot shift. The lateral compartment demonstrated a flap tear that was simply  excised, this was not repairable. Utilization of both the baskets and  the Torpedo suction cutting device, very carefully trimmed out the  damaged crabmeat-like material, leaving approximately 50% of the lateral  meniscus. This completed the procedure, the tourniquet was deflated. Hemostasis was obtained, the portals were closed with interrupted 4-0  Monocryl. He was placed in Ace cotton compression dressing and  discharged to recovery room in excellent condition. Estimated blood  loss about 15 mL.         Dilan Rondon MD    D: 11/16/2020 11:42:17       T: 11/16/2020 11:47:11     [email or web address]  Job#: [id number]     Doc#: [id number]    CC:

## 2020-12-15 ENCOUNTER — OFFICE VISIT (OUTPATIENT)
Dept: ORTHOPEDIC SURGERY | Age: 40
End: 2020-12-15

## 2020-12-15 ENCOUNTER — HOSPITAL ENCOUNTER (OUTPATIENT)
Dept: PHYSICAL THERAPY | Age: 40
Setting detail: THERAPIES SERIES
Discharge: HOME | End: 2020-12-15
Payer: COMMERCIAL

## 2020-12-15 VITALS — HEIGHT: 72 IN | TEMPERATURE: 97.4 F | WEIGHT: 199.08 LBS | BODY MASS INDEX: 26.96 KG/M2

## 2020-12-15 PROCEDURE — 99024 POSTOP FOLLOW-UP VISIT: CPT | Performed by: ORTHOPAEDIC SURGERY

## 2020-12-15 PROCEDURE — 97110 THERAPEUTIC EXERCISES: CPT | Performed by: PHYSICAL THERAPIST

## 2020-12-15 PROCEDURE — 97530 THERAPEUTIC ACTIVITIES: CPT | Performed by: PHYSICAL THERAPIST

## 2020-12-15 PROCEDURE — 97140 MANUAL THERAPY 1/> REGIONS: CPT | Performed by: PHYSICAL THERAPIST

## 2020-12-15 NOTE — PROGRESS NOTES
[address]  History and Physical  Knee Pain    Date:  12/15/2020    Name:  Michelle Donohue  Address:  [address]  [address] [address]    :  1980      Age:   41 y.o.    SSN:  [id number]      Medical Record Number:  <[id number]>      Chief Complaint    Post-Op Check (right knee )      History of Present Illness:  Michelle Donohue is a 41 y.o. male who presents for a post-op check. This patient is about 4 weeks s/p a right knee arthroscopy with a partial lateral meniscectomy. Patient states that he has noticed an improvement in his right knee pain. He no longer experiences the sharp bouts in the lateral joint line. However, he does note a dull achiness in the lateral compartment as well as the patellofemoral compartment when ambulating down steps. In addition, the patient has noticed mild tenderness over his ports with associated scar tissue development in the fat pad. He feels as though his range of motion has recovered however he still notes some mild quadricep atrophy and strength deficit. The patient denies any new injury. The patient denies any giving way, joint locking, numbness, or paresthesias. Pain Assessment  Location of Pain: Knee  Location Modifiers: Right  Severity of Pain: 2  Quality of Pain: Aching  Duration of Pain: Persistent  Frequency of Pain: Constant  Aggravating Factors:  Other (Comment)(n/a)  Relieving Factors: Rest  Result of Injury: No  Work-Related Injury: No  Are there other pain locations you wish to document?: No    Past Medical History:   Diagnosis Date    Arthritis         Past Surgical History:   Procedure Laterality Date    KNEE ARTHROSCOPY Right 2020    RIGHT KNEE ARTHROSCOPY, PARTIAL LATERAL MENISCUS EXCISION performed by Sarina Keating MD at [address] [place] [address] SURGERY Right        Family History   Problem Relation Age of Onset    No Known Problems Mother     High Blood Pressure Father Social History     Socioeconomic History    Marital status:      Spouse name: None    Number of children: None    Years of education: None    Highest education level: None   Occupational History    None   Social Needs    Financial resource strain: None    Food insecurity     Worry: None     Inability: None    Transportation needs     Medical: None     Non-medical: None   Tobacco Use    Smoking status: Never Smoker    Smokeless tobacco: Never Used   Substance and Sexual Activity    Alcohol use: Never     Frequency: Never    Drug use: Never    Sexual activity: None   Lifestyle    Physical activity     Days per week: None     Minutes per session: None    Stress: None   Relationships    Social connections     Talks on phone: None     Gets together: None     Attends Congregation service: None     Active member of club or organization: None     Attends meetings of clubs or organizations: None     Relationship status: None    Intimate partner violence     Fear of current or ex partner: None     Emotionally abused: None     Physically abused: None     Forced sexual activity: None   Other Topics Concern    None   Social History Narrative    None       Current Outpatient Medications   Medication Sig Dispense Refill    ondansetron (ZOFRAN) 4 MG tablet Take 1 tablet by mouth every 8 hours as needed for Nausea or Vomiting 30 tablet 0    senna-docusate (PERICOLACE) 8.6-50 MG per tablet Take 2 tablets by mouth daily as needed for Constipation 30 tablet 0    Multiple Vitamins-Minerals (THERAPEUTIC MULTIVITAMIN-MINERALS) tablet Take 1 tablet by mouth daily      aspirin EC 81 MG EC tablet Take 1 tablet by mouth 2 times daily for 14 days 28 tablet 0     No current facility-administered medications for this visit.         Allergies   Allergen Reactions    Meperidine Hives    Morphine Hives         Review of Systems:

## 2020-12-15 NOTE — FLOWSHEET NOTE
[facility] [employer]  Orthopaedics and Sports Rehabilitation, Liliana Patel    Physical Therapy Daily Treatment Note  Date:  12/15/2020    Patient Name:  Marilyn Dang    :  1980  MRN: [id number]  Restrictions/Precautions:    Medical/Treatment Diagnosis Information:  · Diagnosis: S83.281A (ICD-10-CM) - Acute lateral meniscus tear of right knee, initial encounter  · Treatment Diagnosis: M25.561 R Knee Pain, M25.661 R Knee Stiffness, R53.1 Weakness  · S/P R PLME 20  · Meds:   Insurance/Certification information:  PT Insurance Information: PT BENEFITS 2020 FACILITY/ BCBS/ EFFECTIVE 20/ ACTIVE/   PAYS 80%/ OOP 2750 MET .90/ 60 VPCY COMB/ 0 AUTH/ PRESERVICE AUTOMATED SYSTEM/ 20 PAG  Physician Information:  Referring Practitioner: Deon Pleitez  Has the plan of care been signed (Y/N):        []  Yes  [x]  No     Date of Patient follow up with Physician: 12/15/20 in MD office  Patient seen in consultation with Dr. Eduardo Sanchez who established the initial/subsequent treatment protocol. 20: recommending non-impact activity to preserve lateral TF joint surfaces, able to save 50% of meniscus, ACL is nice and strong      Is this a Progress Report:     []  Yes  [x]  No        If Yes:  Date Range for reporting period:  Beginnin20  Ending:     Progress report will be due (10 Rx or 30 days whichever is less):        Recertification will be due (POC Duration  / 90 days whichever is less): 20         Visit # Insurance Allowable Auth Required   3 60 VPCY []  Yes [x]  No        OUTCOME MEASURE DATE DEFICIT   LEFS pre-op 20 60% Deficit   LEFS post-op 20 76% Deficit     Patient given satisfaction survey?  [] Yes   [] No        Latex Allergy:  [x]NO      []YES  Preferred Language for Healthcare:   [x]English       []other:    Pain level:  0-3/10 SUBJECTIVE:  Pt has been doing okay. Is having popping when going from flex > ext > flex so he's been apprehensive to complete additional strengthening exercises since last visit. Pain with prolonged positioning. Walking, moving, working it is typically pretty comfortable. Feels weak. Has access to gym facilities. OBJECTIVE:   ROM PROM AROM Overpressure Comment     L R L R L R     Flexion 140 130             Extension 0 -2   0                                                   Strength L R Comment   Quad 5 5     Hamstring 5 4     Gastroc NT NT     Hip  flexion 5 5     Hip abd 5 5                               Special Test Results/Comment   Homans NPV   Temp Neg         Girth L R   Mid Patella 40.4 41.0   Suprapatellar 41.8 41.2   5cm above 47.5 45.3   15cm above          Reflexes/Sensation:               [x]? Dermatomes/Myotomes intact               []?Reflexes equal and normal bilaterally              []?Other:     Joint mobility:                [x]? Normal                       []?Hypo              []?Hyper     Palpation: LJL     Functional Mobility/Transfers: mod ind     Posture: good     Bandages/Dressings/Incisions: n/a     Gait: (include devices/WB status) WNL       RESTRICTIONS/PRECAUTIONS: none    Exercises/Interventions:   Exercise/Equipment Resistance/Repetitions Other comments   Stretching     Hamstring    Towel Pull    Inclined Calf     Hip Flexion     ITB 3 x 30\" ea Supine w/ strap & SKTOS   Groin     Quad 3 x 30\" Prone w/ strap                  SLR    Supine    Abduction    Adduction    Prone    SLR+        Isometrics    Quad sets    Add sets         Patellar Glides     Medial     Superior     Inferior          ROM     Sheet Pulls     Hang Weights     Passive     Active     Weight Shift     Ankle Pumps          Glutes     Side Stepping 3 laps Tashia Garcia walks 3 laps Gray                       CKC     Calf raises     Wall sits     Step ups     1 leg stand 3 x 5 twists 4# medball   Squatting 3. Patient will demonstrate an increase in Strength to good proximal hip strength and control, within 5lb HHD in LE to allow for proper functional mobility as indicated by patients Functional Deficits. []? Progressing: []? Met: []? Not Met: []? Adjusted  4. Patient will return to household functional activities without increased symptoms or restriction. []? Progressing: []? Met: []? Not Met: []? Adjusted  5. Pt will be able to walk for at least 20 min without symptoms present. (patient specific functional goal)    []? Progressing: []? Met: []? Not Met: []? Adjusted          Overall Progression Towards Functional goals/ Treatment Progress Update:  [] Patient is progressing as expected towards functional goals listed. [] Progression is slowed due to complexities/Impairments listed. [] Progression has been slowed due to co-morbidities. [x] Plan just implemented, too soon to assess goals progression <30days   [] Goals require adjustment due to lack of progress  [] Patient is not progressing as expected and requires additional follow up with physician  [] Other    Prognosis for POC: [x] Good [] Fair  [] Poor      Patient requires continued skilled intervention: [x] Yes  [] No    Treatment/Activity Tolerance:  [x] Patient able to complete treatment  [] Patient limited by fatigue  [] Patient limited by pain     [] Patient limited by other medical complications  [x] Other:  Pt has tightness of ITB and infrapatellar portals. Visible popping of ITB over femoral condyle with ITB was in tightened position. Updated HEP to include extra stretching and strengthening to minimize this. Also educated pt to do STM of infrapatellar incisions and ice cupping to minimize swelling in infrapatellar area. Pt was symptom-free with program today. Updated HEP provided. Pt will f/u in 4 weeks or sooner if needed. HEP instruction:   Access Code: [email or web address]   URL: [email or web address]   Date: 12/15/2020 Prepared by: Maryjane Aiken     Exercises  Supine ITB Stretch with Strap - 3 reps - 1 sets - 30 sec hold - 1x daily - 7x weekly  Supine Piriformis Stretch with Leg Straight - 3 reps - 1 sets - 30 sec hold - 1x daily - 7x weekly  Prone Quadriceps Stretch with Strap - 3 reps - 1 sets - 30 sec hold - 1x daily - 7x weekly  Gastroc Stretch on Wall - 3 reps - 1 sets - 30 sec hold - 1x daily - 7x weekly  Seated Table Hamstring Stretch - 3 reps - 1 sets - 30 hold - 1x daily - 7x weekly  Supine Bridge - 10 reps - 3 sets - 1x daily - 3x weekly  Clamshell with Resistance - 10 reps - 3 sets - 1x daily - 3x weekly  Side Stepping with Resistance at Thighs - 10 reps - 3 sets - 1x daily - 3x weekly  Diagonal Forward Stepping with Resistance Loop - 10 reps - 3 sets - 1x daily - 3x weekly  Wall Quarter Squat - 3 sets - To muscle fatigue hold - 1x daily - 3x weekly  The Diver - 10 reps - 3 sets - 1x daily - 3x weekly  Single Leg Balance with Trunk Rotation - 5 reps - 3 sets - 1x daily - 3x weekly  Full Leg Press - 10 reps - 3 sets - 1x daily - 3x weekly  Hamstring Curl with Weight Machine - 10 reps - 3 sets - 1x daily - 3x weekly  Knee Extension with Weight Machine - 10 reps - 3 sets - 1x daily - 3x weekly  Hip Abduction Machine - 10 reps - 3 sets - 1x daily - 3x weekly  Hip Adduction Machine - 10 reps - 3 sets - 1x daily - 3x weekly  Heel Raises with Leg Press - 10 reps - 3 sets - 1x daily - 3x weekly               Low Impact Cardio - elliptical, stationary biking, walking           PLAN: See eval. Return for PT/MD follow-up in 4 weeks - consider isometric strength test pending symptoms.    [] Continue per plan of care [] Alter current plan (see comments above)  [x] Plan of care initiated [] Hold pending MD visit [] Discharge      Electronically signed by:  Almaz Escalante, PT, DPT Note: If patient does not return for scheduled/ recommended follow up visits, this note will serve as a discharge from care along with most recent update on progress.

## 2021-12-16 ENCOUNTER — OFFICE VISIT (OUTPATIENT)
Dept: ORTHOPEDIC SURGERY | Age: 41
End: 2021-12-16
Payer: COMMERCIAL

## 2021-12-16 VITALS — WEIGHT: 190 LBS | BODY MASS INDEX: 25.18 KG/M2 | HEIGHT: 73 IN

## 2021-12-16 DIAGNOSIS — M25.511 RIGHT SHOULDER PAIN, UNSPECIFIED CHRONICITY: Primary | ICD-10-CM

## 2021-12-16 DIAGNOSIS — M25.511 ARTHRALGIA OF RIGHT ACROMIOCLAVICULAR JOINT: ICD-10-CM

## 2021-12-16 PROCEDURE — 99203 OFFICE O/P NEW LOW 30 MIN: CPT | Performed by: PHYSICIAN ASSISTANT

## 2021-12-16 NOTE — LETTER
Physical Therapy Rehabilitation Referral    Patient Name:  Silvano Espana      YOB: 1980    Diagnosis:    1. Right shoulder pain, unspecified chronicity    2. Arthralgia of right acromioclavicular joint        Precautions:     [x] Evaluate and Treat    Post Op Instructions:  [] Continuous passive motion (CPM) [] Elbow ROM  [x] Exercise in plane of scapula  []  Strengthening     [] Pulley and instruction   [x] Home exercise program (copy to patient)   [] Sling when arm at risk  [] Sling or brace at all times   [] AAROM: Forward elevation to  140            [] AAROM: External rotation  To  40    [] Isometric external rotator strengthening [] AAROM: internal rotation: up the back  [x] Isometric abductor strengthening  [] AAROM: Internal abduction   [] Isometric internal rotator strengthening [] AAROM: cross-body adduction             Stretching:     Strengthening:  [] Four quadrant (FE, ER, IR, CBA)  [x] Rotator cuff (ER, IR, Abd)  [] Forward Elevation    [] External Rotators     [] External Rotation    [] Internal Rotators  [] Internal Rotation: up/back   [] Abductors     [] Internal Rotation: supine in abduction  [] Sleeper Stretch    [] Flexors  [] Cross-body abduction    [] Extensors  [] Pendulum (FE, Abd/Add, cw/ccw)  [x] Scapular Stabilizers   [] Wall-walking (FE, Abd)        [x] Shoulder shrugs     [] Table slides (FE)                [x] Rhomboid pinch  [] Elbow (flex, ext, pron, sup)        [] Lat.  Pull downs     [] Medial epicondylitis program       [] Forward punch   [] Lateral epicondylitis program       [] Internal rotators     [x] Progressive resistive exercises  [] Bench Press        [] Bench press plus  Activities:     [] Lateral pull-downs  [x] Rowing     [] Progressive two-hand supine press  [] Stepper/Exercise bike   [x] Biceps: curls/supination  [] Swimming  [] Water exercises    Modalities:     Return to Sport:  [x] Of Choice      [] Plyometrics  [] Ultrasound     [] Rhythmic stabilization  [] Iontophoresis    [] Core strengthening   [] Moist heat     [] Sports specific program:   [] Massage         [x] Cryotherapy      [] Electrical stimulation     [] Paraffin  [] Whirlpool  [] TENS    [x] Home exercise program (copy to patient). Perform exercises for:   15     minutes    3      times/day  [x] Supervised physical therapy  Frequency: []  1x week  [x] 2x week  [] 3x week  [] Other:   Duration: [] 2 weeks   [] 4 weeks  [x] 6 weeks  [] Other:     Additional Instructions:     Samer S. Merton Spatz, MD, PhD

## 2021-12-28 NOTE — PROGRESS NOTES
[facility]  History and Physical  Shoulder Pain    Date:  2021    Name:  Maryjane Ya  Address:  [address]  [address] [address]    :  1980      Age:   35 y.o.    SSN:  [id number]      Medical Record Number:  <[id number]>    Reason for Visit:    New Patient (Right Shoulder Eval)      HPI:   Maryjane Ya is a 35 y.o. male who presents to our office today complaining of  right shoulder pain. Experiencing pain over the last 2 years. He has essentially stopped working out for the last year. He reports its unclear if he actually injured his shoulder in any way but he does recalls that he felt a pop while working out more than a year ago in his right shoulder. He reports he can no longer do body weight pull-ups. The pain can be sharp and persistent however intermittent. It occurs primarily when he is doing heavier exercises or lifting. He currently works at a desk in banking. Pain Assessment  Location of Pain: Shoulder  Location Modifiers: Right  Severity of Pain: 5  Quality of Pain: Sharp  Duration of Pain: Persistent  Frequency of Pain: Intermittent  Aggravating Factors:  (bodyweight pullups)  Limiting Behavior: Yes  Relieving Factors: Rest,Nsaids  Work-Related Injury: No  Are there other pain locations you wish to document?: No    Review of Systems:  A 14 point review of systems available in the scanned medical record as documented by the patient. The review is negative with the exception of those things mentioned in the History of Present Illness and Past Medical History.       Past History:  Past Medical History:   Diagnosis Date    Arthritis      Past Surgical History:   Procedure Laterality Date    KNEE ARTHROSCOPY Right 2020    RIGHT KNEE ARTHROSCOPY, PARTIAL LATERAL MENISCUS EXCISION performed by Raheem Logan MD at [address] [place] [address] Right      Current Outpatient Medications on File Prior to Visit Medication Sig Dispense Refill    Multiple Vitamins-Minerals (THERAPEUTIC MULTIVITAMIN-MINERALS) tablet Take 1 tablet by mouth daily       No current facility-administered medications on file prior to visit. Social History     Socioeconomic History    Marital status:      Spouse name: Not on file    Number of children: Not on file    Years of education: Not on file    Highest education level: Not on file   Occupational History    Not on file   Tobacco Use    Smoking status: Never Smoker    Smokeless tobacco: Never Used   Substance and Sexual Activity    Alcohol use: Never    Drug use: Never    Sexual activity: Not on file   Other Topics Concern    Not on file   Social History Narrative    Not on file     Social Determinants of Health     Financial Resource Strain:     Difficulty of Paying Living Expenses: Not on file   Food Insecurity:     Worried About Running Out of Food in the Last Year: Not on file    Patt of Food in the Last Year: Not on file   Transportation Needs:     Lack of Transportation (Medical): Not on file    Lack of Transportation (Non-Medical):  Not on file   Physical Activity:     Days of Exercise per Week: Not on file    Minutes of Exercise per Session: Not on file   Stress:     Feeling of Stress : Not on file   Social Connections:     Frequency of Communication with Friends and Family: Not on file    Frequency of Social Gatherings with Friends and Family: Not on file    Attends Pent[employer]ism Services: Not on file    Active Member of Clubs or Organizations: Not on file    Attends Club or Organization Meetings: Not on file    Marital Status: Not on file   Intimate Partner Violence:     Fear of Current or Ex-Partner: Not on file    Emotionally Abused: Not on file    Physically Abused: Not on file    Sexually Abused: Not on file   Housing Stability:     Unable to Pay for Housing in the Last Year: Not on file    Number of [place] in the Last Year: Not on palpable radial pulses 2+  Comparison left shoulder Examination:    Examination of the contralateral extremity does not show any tenderness, deformity or injury. Range of motion is unremarkable. There is no gross instability. There are no rashes, ulcerations or lesions. Strength and tone are normal.    Laboratory:  No visits with results within 14 Day(s) from this visit. Latest known visit with results is:   Nurse Only on 11/11/2020   Component Date Value    SARS-CoV-2 11/11/2020 Not Detected       No results found for this or any previous visit (from the past 24 hour(s)). Radiographic:  3 xray views of the right  shoulder including True AP in internal and external and axillary lateral were taken in our office today reveal no fractures, dislocations, visible tumors, or signs of acute trauma. Self assessment questionnaires including ASES and Simple Shoulder Test were completed today. Assessment:  Soraida Simon is a 35 y.o. male with right shoulder pain related to [facility] joint pain. Impression:  Encounter Diagnosis   Name Primary?  Right shoulder pain, unspecified chronicity Yes       Office Procedures:  Orders Placed This Encounter   Procedures    XR SHOULDER RIGHT (MIN 2 VIEWS)     Standing Status:   Future     Number of Occurrences:   1     Standing Expiration Date:   12/16/2022       Plan:   Recommended trial conservative management including physical therapy. Therapy orders were placed in the chart today. He may use oral anti-inflammatory agents as needed for comfort. Also use topical creams like Voltaren gel. We will see how he does with the above treatment program and see him back for reassessment in 4 weeks. 12/28/2021  10:31 AM      Irene Anglin PA-C  Orthopaedic Sports Medicine Physician Assistant    This dictation was performed with a verbal recognition program [facility]) and it was checked for errors. It is possible that there are still dictated errors within this office note. If so, please bring any errors to my attention for an addendum. All efforts were made to ensure that this office note is accurate.

## 2022-01-07 ENCOUNTER — EVALUATION (OUTPATIENT)
Dept: PHYSICAL THERAPY | Age: 42
End: 2022-01-07
Payer: COMMERCIAL

## 2022-01-07 DIAGNOSIS — M25.511 CHRONIC RIGHT SHOULDER PAIN: Primary | ICD-10-CM

## 2022-01-07 DIAGNOSIS — M25.511 ARTHRALGIA OF RIGHT ACROMIOCLAVICULAR JOINT: ICD-10-CM

## 2022-01-07 DIAGNOSIS — G89.29 CHRONIC RIGHT SHOULDER PAIN: Primary | ICD-10-CM

## 2022-01-07 PROCEDURE — 97530 THERAPEUTIC ACTIVITIES: CPT | Performed by: PHYSICAL THERAPIST

## 2022-01-07 PROCEDURE — 97110 THERAPEUTIC EXERCISES: CPT | Performed by: PHYSICAL THERAPIST

## 2022-01-07 PROCEDURE — 97161 PT EVAL LOW COMPLEX 20 MIN: CPT | Performed by: PHYSICAL THERAPIST

## 2022-01-07 NOTE — PROGRESS NOTES
[facility]   Phone: [phone]    Fax: [phone]     Physical Therapy Certification    Dear Referring Practitioner: EN Harrison,    We had the pleasure of evaluating the following patient for physical therapy services at [address] [place] [address]. A summary of our findings can be found in the initial assessment below. This includes our plan of care. If you have any questions or concerns regarding these findings, please do not hesitate to contact me at the office phone number checked above. Thank you for the referral.       Physician Signature:_______________________________Date:__________________  By signing above (or electronic signature), therapists plan is approved by physician      Patient: Anen-Marie Pradhan   : 1980   MRN: <[id number]>  Referring Physician: Referring Practitioner: EN Harrison      Evaluation Date: 2022      Medical Diagnosis Information:  Diagnosis: Right shoulder pain; AC arthralgia                                             Insurance information: PT Insurance Information: BCBS    Precautions/ Contra-indications: none  Latex Allergy:  [x]NO      []YES    C-SSRS Triggered by Intake questionnaire (Past 2 wk assessment):   [x] No, Questionnaire did not trigger screening.   [] Yes, Patient intake triggered further evaluation      [] C-SSRS Screening completed  [] PCP notified via Plan of Care  [] Emergency services notified     Preferred Language for Healthcare:   [x]English       []other:      SUBJECTIVE:   20 years ago dislocated right shoulder. Has always been \"weaker\" since then. Over the last year the pain in his right shoulder has worsened. Has had pain and limitations with reaching overhead, reaching across his body. Got in to see Dr Srinath Burton were taken. No MRI at this point. He takes Advil once in awhile as needed. He does have intermittent popping/cracking.   Right hand dominant        Relevant Medical History: multiple knee surgeries ([place])  Functional Disability Index: UEFI 46.25% functional limitation    Pain Scale: 0-8/10  Easing factors: avoid pain provoking activities  Provocative factors: throwing a ball, push up     Type: []Constant   [x]Intermittent  []Radiating []Localized []other:     Numbness/Tingling: none noted    Occupation/School: bank rep, does a lot of driving    Living Status/Prior Level of Function: Independent with ADLs and IADLs, He is working out but only doing limited exercises. OBJECTIVE:     ROM PROM AROM  Comment    L R L R 1/7/2022   Flexion   WNL WNL    Abduction   WNL WNL    ER@ 90   110 100    IR @ 90   50 35    Other        Other             Strength L R Comment   Flexion 5/5 4+/5 1/7/2022   Abduction 5/5 4/5    ER 4+/5 4/5    IR 5/5 4/5    Supraspinatus  4-/5    Upper Trap      Lower Trap      Mid Trap      Rhomboids      Biceps      Triceps      Horizontal Abduction      Horizontal Adduction      Lats          Special Tests  1/7/2022 Results/Comment   Cortes (-)   Neers (-)   Speeds    OBriens    Other    Neurologic Signs    Functional Reach OH/ER  Left:   T4                 Right:  T3  BB/IR  Left:  T10                    Right:  T12       Reflexes/Sensation:    [x]Dermatomes/Myotomes intact    []Reflexes equal and normal bilaterally   []Other:    Joint mobility:    [x]Normal    []Hypo   []Hyper    Palpation: some TTP LHBT    Functional Mobility/Transfers: independent with all    Posture: mild forward shoulder    Bandages/Dressings/Incisions: NA    Gait: (include devices/WB status): WNL    Orthopedic Special Tests: see above                       [x] Patient history, allergies, meds reviewed. Medical chart reviewed. See intake form. Review Of Systems (ROS):  [x]Performed Review of systems (Integumentary, CardioPulmonary, Neurological) by intake and observation. Intake form has been scanned into medical record.  Patient has been instructed to contact their primary care physician regarding ROS issues if not already being addressed at this time. Co-morbidities/Complexities (which will affect course of rehabilitation):   []None           Arthritic conditions   []Rheumatoid arthritis (M05.9)  []Osteoarthritis (M19.91)   Cardiovascular conditions   []Hypertension (I10)  []Hyperlipidemia (E78.5)  []Angina pectoris (I20)  []Atherosclerosis (I70)   Musculoskeletal conditions   []Disc pathology   []Congenital spine pathologies   []Prior surgical intervention  []Osteoporosis (M81.8)  []Osteopenia (M85.8)   Endocrine conditions   []Hypothyroid (E03.9)  []Hyperthyroid Gastrointestinal conditions   []Constipation (O59.67)   Metabolic conditions   []Morbid obesity (E66.01)  []Diabetes type 1(E10.65) or 2 (E11.65)   []Neuropathy (G60.9)     Pulmonary conditions   []Asthma (J45)  []Coughing   []COPD (J44.9)   Psychological Disorders  []Anxiety (F41.9)  []Depression (F32.9)   []Other:   [x]Other:  Prior multiple knee surgeries        Barriers to/and or personal factors that will affect rehab potential:              []Age  []Sex              [x]Motivation/Lack of Motivation                        []Co-Morbidities              []Cognitive Function, education/learning barriers              []Environmental, home barriers              []profession/work barriers  [x]past PT/medical experience  []other:  Justification:      Falls Risk Assessment (30 days):   [x] Falls Risk assessed and no intervention required.   [] Falls Risk assessed and Patient requires intervention due to being higher risk   TUG score (>12s at risk):     [] Falls education provided, including        ASSESSMENT:   Functional Impairments   []Noted spinal or UE joint hypomobility   []Noted spinal or UE joint hypermobility   [x]Decreased UE functional ROM   [x]Decreased UE functional strength   []Abnormal reflexes/sensation/myotomal/dermatomal deficits   [x]Decreased RC/scapular/core strength and neuromuscular control   []other:      Functional Activity Limitations (from functional questionnaire and intake)   []Reduced ability to tolerate prolonged functional positions   [x]Reduced ability or difficulty with changes of positions or transfers between positions   []Reduced ability to maintain good posture and demonstrate good body mechanics with sitting, bending, and lifting   [x] Reduced ability or tolerance with driving and/or computer work   []Reduced ability to sleep   [x]Reduced ability to perform lifting, reaching, carrying tasks   [x]Reduced ability to tolerate impact through UE   [x]Reduced ability to reach behind back   []Reduced ability to  or hold objects   [x]Reduced ability to throw or toss an object   []other:    Participation Restrictions   []Reduced participation in self care activities   []Reduced participation in home management activities   []Reduced participation in work activities   [x]Reduced participation in social activities. [x]Reduced participation in sport/recreation activities. Classification:   []Signs/symptoms consistent with post-surgical status including decreased ROM, strength and function.   []Signs/symptoms consistent with joint sprain/strain   []Signs/symptoms consistent with shoulder impingement   [x]Signs/symptoms consistent with shoulder/elbow/wrist tendinopathy   []Signs/symptoms consistent with Rotator cuff tear   []Signs/symptoms consistent with labral tear   []Signs/symptoms consistent with postural dysfunction    []Signs/symptoms consistent with Glenohumeral IR Deficit - <45 degrees   []Signs/symptoms consistent with facet dysfunction of cervical/thoracic spine    []Signs/symptoms consistent with pathology which may benefit from Dry needling     []other:     Prognosis/Rehab Potential:      []Excellent   [x]Good    []Fair   []Poor    Tolerance of evaluation/treatment:    []Excellent   [x]Good    []Fair   []Poor    Physical Therapy Evaluation Complexity Justification  [x] A history of present problem with:  [x] no personal factors and/or comorbidities that impact the plan of care;  []1-2 personal factors and/or comorbidities that impact the plan of care  []3 personal factors and/or comorbidities that impact the plan of care  [x] An examination of body systems using standardized tests and measures addressing any of the following: body structures and functions (impairments), activity limitations, and/or participation restrictions;:  [x] a total of 1-2 or more elements   [] a total of 3 or more elements   [] a total of 4 or more elements   [x] A clinical presentation with:  [x] stable and/or uncomplicated characteristics   [] evolving clinical presentation with changing characteristics  [] unstable and unpredictable characteristics;   [x] Clinical decision making of [x] low, [] moderate, [] high complexity using standardized patient assessment instrument and/or measurable assessment of functional outcome. [x] EVAL (LOW) 54207 (typically 20 minutes face-to-face)  [] EVAL (MOD) 62773 (typically 30 minutes face-to-face)  [] EVAL (HIGH) 71608 (typically 45 minutes face-to-face)  [] RE-EVAL     PLAN:  Frequency/Duration: 1 days per week for 4-6 Weeks:  INTERVENTIONS:  [x] Therapeutic exercise including: strength training, ROM, for Upper extremity and core   [x]  NMR activation and proprioception for UE, scap and Core   [x] Manual therapy as indicated for shoulder, scapula and spine to include: Dry Needling/IASTM, STM, PROM, Gr I-IV mobilizations, manipulation. [x] Modalities as needed that may include: thermal agents, E-stim, Biofeedback, US, iontophoresis as indicated  [x] Patient education on joint protection, postural re-education, activity modification, progression of HEP. HEP instruction: Patient returned proper demonstration of HEP. Issued patient written program clearly stating sets/reps/sessions per day.   Written program was scanned into media section of medical record. GOALS:  Patient stated goal: decrease pain and return to working out  [] Progressing: [] Met: [] Not Met: [] Adjusted    Therapist goals for Patient:   Short Term Goals: To be achieved in: 2 weeks  1. Independent in HEP and progression per patient tolerance, in order to prevent re-injury. [] Progressing: [] Met: [] Not Met: [] Adjusted  2. Patient will have a decrease in pain to facilitate improvement in movement, function, and ADLs as indicated by Functional Deficits. [] Progressing: [] Met: [] Not Met: [] Adjusted    Long Term Goals: To be achieved in: 4 weeks  1. Disability index score of 10% or less for the UEFI to assist with reaching prior level of function. [] Progressing: [] Met: [] Not Met: [] Adjusted  2. Patient will demonstrate increased AROM to full throughout to allow for proper joint functioning as indicated by patients Functional Deficits. [] Progressing: [] Met: [] Not Met: [] Adjusted  3. Patient will demonstrate an increase in Strength by 1/2 grade to allow for proper functional mobility as indicated by patients Functional Deficits. [] Progressing: [] Met: [] Not Met: [] Adjusted  4. Patient will return to prior functional activities without increased symptoms or restriction. [] Progressing: [] Met: [] Not Met: [] Adjusted  5.  Able to do all reaching and lifting without increase pain, return to gym workouts painfree   [] Progressing: [] Met: [] Not Met: [] Adjusted     Electronically signed by:      Stanley Blair [address] [place] [address] #[id number]  [facility] #[id number]

## 2022-01-07 NOTE — PROGRESS NOTES
[facility]   Phone: [phone]    Fax: [phone]      Physical Therapy Treatment Note/ Progress Report:           Date:  2022    Patient Name:  Sandi Orozco    :  1980  MRN: <[id number]>  Restrictions/Precautions:    Medical/Treatment Diagnosis Information:  · Diagnosis: Right shoulder pain; AC arthralgia  ·    Insurance/Certification information:  PT Insurance Information: BCBS  Physician Information:  Referring Practitioner: EN Cates  Has the plan of care been signed (Y/N):        []  Yes  [x]  No     Date of Patient follow up with Physician: 2022      Is this a Progress Report:     []  Yes  [x]  No        If Yes:  Date Range for reporting period:  Beginning  2022  Ending  2022    Progress report will be due (10 Rx or 30 days whichever is less):       Recertification will be due (POC Duration  / 90 days whichever is less): 2022        Visit # Insurance Allowable Auth Required   1 60 []  Yes [x]  No        Functional Scale: UEFI 46.25% limited   Date assessed:  2022      Latex Allergy:  [x]NO      []YES  Preferred Language for Healthcare:   [x]English       []other:      Pain level:  0-8/10     SUBJECTIVE:  See eval    OBJECTIVE:     ROM PROM AROM  Comment    L R L R 2022   Flexion   WNL WNL    Abduction   WNL WNL    ER@ 90   110 100    IR @ 90   50 35    Other        Other             Strength L R Comment   Flexion 5/5 4+/5 2022   Abduction 5/5 4/5    ER 4+/5 4/5    IR 5/5 4/5    Supraspinatus  4-/5    Upper Trap      Lower Trap      Mid Trap      Rhomboids      Biceps      Triceps      Horizontal Abduction      Horizontal Adduction      Lats          Special Tests  2022 Results/Comment   Cortes (-)   Neharrison (-)   Speeds    OBriens    Other    Neurologic Signs    Functional Reach OH/ER  Left:   T4                 Right:  T3  BB/IR  Left:  T10                    Right:  T12 RESTRICTIONS/PRECAUTIONS: none    Exercises/Interventions:       Therapeutic Ex (40763) Sets/sec Reps Notes/CUES   AD UE BIKE            STRETCHING/ROM      Pulleys      Table Slides      Wand      UE [place]      Pendulum      Doorway      Wall Slides      CBA      TP BB      Sleeper  10\" x5    Elbow PROM/AROM                  ISOMETRICS      Gripping      Retraction      Abduction      Flexion      Internal Rotation      External Rotation            STRENGTHENING-PREs      Flexion      Abduction      Internal Rotation      External Rotation 3# x30 SDLY   Prone ER at 90 1# x30    Shrugs      Biceps      Triceps      Retraction      Extension      Horizontal Abduction in ER 1# x30 Prone SA   Serratus                        THERABANDS/CABLE COLUMN      Rows  x30 Silver   Lats      Extension      Internal Rotation  x30 Blue   External Rotation  x30 Blue   Biceps      Triceps      PNF                                    Manual Intervention (32257)      Scar Massage      STM      Hawkgrips      GH joint mobilizations      Foamroll                  NMR re-education (28285)   CUES NEEDED   Plyoback      Therabar oscillations      Body Blade       Rhythmic Stabilization      Ball on the wall                              Therapeutic Activity (02522)      Core training      Swiss ball activities      Education x10'  Education on anatomy and pathology. Reviewed workout precautions. Needs to avoid overhead press. Only focus on below shoulder level painfree weight lifting for now. Therapeutic Exercise and NMR EXR  [x] (29747) Provided verbal/tactile cueing for activities related to strengthening, flexibility, endurance, ROM  for improvements in scapular, scapulothoracic and UE control with self care, reaching, carrying, lifting, house/yardwork, driving/computer work.     [x] (64099) Provided verbal/tactile cueing for activities related to improving balance, coordination, kinesthetic sense, posture, motor skill, proprioception  to assist with  scapular, scapulothoracic and UE control with self care, reaching, carrying, lifting, house/yardwork, driving/computer work. Therapeutic Activities:    [x] (63460 or 65495) Provided verbal/tactile cueing for activities related to improving balance, coordination, kinesthetic sense, posture, motor skill, proprioception and motor activation to allow for proper function of scapular, scapulothoracic and UE control with self care, carrying, lifting, driving/computer work. Home Exercise Program:    [x] (96876) Reviewed/Progressed HEP activities related to strengthening, flexibility, endurance, ROM of scapular, scapulothoracic and UE control with self care, reaching, carrying, lifting, house/yardwork, driving/computer work  [] (63711) Reviewed/Progressed HEP activities related to improving balance, coordination, kinesthetic sense, posture, motor skill, proprioception of scapular, scapulothoracic and UE control with self care, reaching, carrying, lifting, house/yardwork, driving/computer work      Manual Treatments:  PROM / STM / Oscillations-Mobs:  G-I, II, III, IV (PA's, Inf., Post.)  [] (64041) Provided manual therapy to mobilize soft tissue/joints of cervical/CT, scapular GHJ and UE for the purpose of modulating pain, promoting relaxation,  increasing ROM, reducing/eliminating soft tissue swelling/inflammation/restriction, improving soft tissue extensibility and allowing for proper ROM for normal function with self care, reaching, carrying, lifting, house/yardwork, driving/computer work    Modalities:  Ice x10'   [] GAME READY (VASO)- for significant edema, swelling, pain control.     Charges:    Timed Code Treatment Minutes: 35   Total Treatment Minutes: 50      [x] EVAL (LOW) 34970 (typically 20 minutes face-to-face)  [] EVAL (MOD) 44341 (typically 30 minutes face-to-face)  [] EVAL (HIGH) 423 0039 (typically 45 minutes face-to-face)  [] RE-EVAL     [x] EI(36897) x 1 (x15')    [] IONTO  [] NMR (42543) x     [] VASO  [] Manual (58638) x     [] Other:  [x] TA x 1 (x10')     [] Mech Traction (92095)  [] ES(attended) (23092)      [] ES (un) (40322):         ASSESSMENT:  See eval        GOALS:  Patient stated goal: decrease pain and return to working out  [] Progressing: [] Met: [] Not Met: [] Adjusted    Therapist goals for Patient:   Short Term Goals: To be achieved in: 2 weeks  1. Independent in HEP and progression per patient tolerance, in order to prevent re-injury. [] Progressing: [] Met: [] Not Met: [] Adjusted  2. Patient will have a decrease in pain to facilitate improvement in movement, function, and ADLs as indicated by Functional Deficits. [] Progressing: [] Met: [] Not Met: [] Adjusted    Long Term Goals: To be achieved in: 4 weeks  1. Disability index score of 10% or less for the UEFI to assist with reaching prior level of function. [] Progressing: [] Met: [] Not Met: [] Adjusted  2. Patient will demonstrate increased AROM to full throughout to allow for proper joint functioning as indicated by patients Functional Deficits. [] Progressing: [] Met: [] Not Met: [] Adjusted  3. Patient will demonstrate an increase in Strength by 1/2 grade to allow for proper functional mobility as indicated by patients Functional Deficits. [] Progressing: [] Met: [] Not Met: [] Adjusted  4. Patient will return to prior functional activities without increased symptoms or restriction. [] Progressing: [] Met: [] Not Met: [] Adjusted  5. Able to do all reaching and lifting without increase pain, return to gym workouts painfree   [] Progressing: [] Met: [] Not Met: [] Adjusted       Overall Progression Towards Functional goals/ Treatment Progress Update:  [] Patient is progressing as expected towards functional goals listed. [] Progression is slowed due to complexities/Impairments listed. [] Progression has been slowed due to co-morbidities.   [x] Plan just implemented, too soon to assess goals progression <30days   [] Goals require adjustment due to lack of progress  [] Patient is not progressing as expected and requires additional follow up with physician  [] Other    Prognosis for POC: [x] Good [] Fair  [] Poor      Patient requires continued skilled intervention: [x] Yes  [] No    Treatment/Activity Tolerance:  [x] Patient able to complete treatment  [] Patient limited by fatigue  [] Patient limited by pain    [] Patient limited by other medical complications  [] Other:           PLAN: See sotero.  Follow up with patient after he sees Dr Alejo Chavira on 1/13/2022  [] Continue per plan of care [] Alter current plan   [x] Plan of care initiated [] Hold pending MD visit [] Discharge      Electronically signed by:      Stanley Prajapati  PT #[id number]  [facility] PT #[id number]      Note: If patient does not return for scheduled/ recommended follow up visits, this note will serve as a discharge from care along with most recent update on progress.

## 2022-01-13 ENCOUNTER — OFFICE VISIT (OUTPATIENT)
Dept: ORTHOPEDIC SURGERY | Age: 42
End: 2022-01-13
Payer: COMMERCIAL

## 2022-01-13 VITALS — HEIGHT: 73 IN | WEIGHT: 190 LBS | BODY MASS INDEX: 25.18 KG/M2

## 2022-01-13 DIAGNOSIS — M25.511 ARTHRALGIA OF RIGHT ACROMIOCLAVICULAR JOINT: ICD-10-CM

## 2022-01-13 DIAGNOSIS — M25.511 RIGHT SHOULDER PAIN, UNSPECIFIED CHRONICITY: Primary | ICD-10-CM

## 2022-01-13 PROCEDURE — 99214 OFFICE O/P EST MOD 30 MIN: CPT | Performed by: ORTHOPAEDIC SURGERY

## 2022-01-13 RX ORDER — MELOXICAM 15 MG/1
15 TABLET ORAL DAILY PRN
Qty: 30 TABLET | Refills: 5 | Status: SHIPPED | OUTPATIENT
Start: 2022-01-13

## 2022-01-13 RX ORDER — BENZONATATE 200 MG/1
200 CAPSULE ORAL 3 TIMES DAILY PRN
COMMUNITY
Start: 2022-01-12 | End: 2022-01-22

## 2022-01-13 RX ORDER — PREDNISONE 20 MG/1
TABLET ORAL
COMMUNITY
Start: 2022-01-12

## 2022-01-13 NOTE — LETTER
Physical Therapy Rehabilitation Referral    Patient Name:  Kevin Gonzalez      YOB: 1980    Diagnosis:    1. Right shoulder pain, unspecified chronicity    2. Arthralgia of right acromioclavicular joint        Precautions:     [x] Evaluate and Treat    Post Op Instructions:  [] Continuous passive motion (CPM)  [] Elbow ROM  [x] Exercise in plane of scapula  []  Strengthening     [] Pulley and instruction   [x] Home exercise program (copy to patient)   [] Sling when arm at risk  [] Sling or brace at all times   [] AAROM: Forward elevation to  140            [] AAROM: External rotation  To  40    [] Isometric external rotator strengthening [] AAROM: internal rotation: up the back  [x] Isometric abductor strengthening  [] AAROM: Internal abduction   [] Isometric internal rotator strengthening [] AAROM: cross-body adduction             Stretching:     Strengthening:  [] Four quadrant (FE, ER, IR, CBA)  [x] Rotator cuff (ER, IR, Abd)  [] Forward Elevation    [] External Rotators     [] External Rotation    [] Internal Rotators  [] Internal Rotation: up/back   [] Abductors     [] Internal Rotation: supine in abduction  [] Sleeper Stretch    [] Flexors  [] Cross-body abduction    [] Extensors  [] Pendulum (FE, Abd/Add, cw/ccw)  [x] Scapular Stabilizers   [] Wall-walking (FE, Abd)        [x] Shoulder shrugs     [] Table slides (FE)                [x] Rhomboid pinch  [] Elbow (flex, ext, pron, sup)        [] Lat.  Pull downs     [] Medial epicondylitis program       [] Forward punch   [] Lateral epicondylitis program       [] Internal rotators     [x] Progressive resistive exercises  [] Bench Press        [] Bench press plus  Activities:     [] Lateral pull-downs  [x] Rowing     [] Progressive two-hand supine press  [] Stepper/Exercise bike   [x] Biceps: curls/supination  [] Swimming  [] Water exercises    Modalities:     Return to Sport:  [x] Of Choice      [] Plyometrics  [] Ultrasound     [x] Rhythmic stabilization  [] Iontophoresis    [] Core strengthening   [] Moist heat     [] Sports specific program:   [] Massage         [x] Cryotherapy      [] Electrical stimulation     [] Paraffin  [] Whirlpool  [] TENS    [x] Home exercise program (copy to patient). Perform exercises for:   15     minutes    3      times/day  [x] Supervised physical therapy  Frequency: []  1x week  [x] 2x week  [] 3x week  [] Other:   Duration: [] 2 weeks   [] 4 weeks  [x] 6 weeks  [] Other:     Additional Instructions:     Clifford Browne MD, PhD

## 2022-01-13 NOTE — PROGRESS NOTES
Chief Complaint    Shoulder Pain (F/U RIGHT SHOULDER)      History of Present Illness:  Danyell Hooper is a pleasant, RHD, 38 y.o., male, here today for follow up of his right shoulder. To recap this patient has had pain for approximately 2 years. 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 His shoulder has never felt right since then. He was seen by Art Brown PA-C who did go ahead and initiate a trial of conservative treatment for [facility] joint pain. Modalities have been inclusive of  topical Voltaren, physical therapy and activity modification. He has had one session of physical therapy at the [facility] office. He does own a small farm in [place] and has difficulty with his daily chores, especially with movements such as turning a wheel or repetitive movements. He is mostly frustrated that he is unable to workout. He cannot do overhead press or any bodyweight exercises. He reports no new injuries or setbacks. Pain Assessment  Location of Pain: Shoulder  Location Modifiers: Right  Severity of Pain: 2  Quality of Pain: Sharp  Duration of Pain: A few hours  Frequency of Pain: Intermittent  Aggravating Factors: Stretching,Exercise  Limiting Behavior: Some  Relieving Factors: Rest,Nsaids  Work-Related Injury: No  Are there other pain locations you wish to document?: No      Medical History:  Patient's medications, allergies, past medical, surgical, social and family histories were reviewed and updated as appropriate. No notes on file    Review of Systems  A 14 point review of systems was completed by the patient on 12/28/21 and is available in the media section of the scanned medical record and was reviewed on 1/13/2022. The review is negative with the exception of those things mentioned in the HPI and Past Medical History    Vital Signs: There were no vitals filed for this visit.     General/Appearance: Alert and oriented and in no apparent distress. Skin:  There are no skin lesions, cellulitis, or extreme edema. The patient has warm and well-perfused Bilateral upper extremities with brisk capillary refill. Right Shoulder Exam:  Inspection: AC joint is prominent which reduces with forward elevation, No gross deformities, no signs of infection. Palpation: Non-tender bony landmarks, biceps or rotator cuff, tenderness over the posterior joint line    Active Range of Motion:  Full active motion    Passive Range of Motion: Same as active    Strength:  External Rotation 5/5, Internal Rotation 5/5, Champagne Toast 4+/5 with some pain     Special Tests:  AC joint pain with cross arm adduction, Negative Melquiades's, Negative Hawkin's, No Arias muscle deformity. Neurovascular: Sensation to light touch is intact, no motor deficits, palpable radial pulses 2+      Radiology:     No new XR obtained at this time. Assessment :  Mr. Raymon Espana is a pleasant, 38 y.o. patient with history of right AC joint separation currently with pain and weakness of the right shoulder. Impression:  Encounter Diagnoses   Name Primary?  Right shoulder pain, unspecified chronicity Yes    Arthralgia of right acromioclavicular joint        Office Procedures:  Orders Placed This Encounter   Procedures    [facility] Physical Therapy     Referral Priority:   Routine     Referral Type:   Eval and Treat     Referral Reason:   Specialty Services Required     Requested Specialty:   Physical Therapy     Number of Visits Requested:   1       Treatment Plan: We have two options moving forward including both conservative vs surgical treatment options. Conservatively we could continue in a targeted rehab program. In addition we would recommend an intra-articular corticosteroid injection today. Surgically we could offer an [facility] joint reconstruction. We discussed in detail risks, benefits and expectations postoperatively.  We also discussed a recovery timeline following this operation. After discussing these options, he would like to give conservative treatment a better chance at addressing his shoulder problem since he has only had one therapy session thus far. This is appropriate. He would like to hold on the injection today. We will consider this if symptoms worsen or persist. A new physical therapy letter was documented in EPIC today. We will also go ahead and call meloxicam in to his pharmacy. We will see Severo Garcia back in 6 weeks and/or as needed. All questions were answered to patient's satisfaction and He was encouraged to call with any further questions or concerns. [facility] is in agreement with this plan. Greater than 30 minutes were expended on all aspects of today's visit. 1/13/2022  10:01 AM    Mateus Bhakta ATC  [address] [facility]    During this examination, I, Nay Dg, functioned as a scribe for Dr. Kelly Crenshaw. The history taking and physical examination were performed by Dr. Pepper Quezada. All counseling during the appointment was performed between the patient and Dr. Pepper Quezada. 1/13/22  ______________  I, Dr. Kelly Crenshaw, personally performed the services described in this documentation as described by Mateus Bhakta ATC in my presence, and it is both accurate and complete. Clifford Quezada MD, PhD  1/13/2022

## 2024-12-12 ENCOUNTER — OFFICE VISIT (OUTPATIENT)
Dept: ORTHOPEDIC SURGERY | Age: 44
End: 2024-12-12

## 2024-12-12 VITALS — HEIGHT: 72 IN | WEIGHT: 210 LBS | BODY MASS INDEX: 28.44 KG/M2

## 2024-12-12 DIAGNOSIS — M25.521 RIGHT ELBOW PAIN: ICD-10-CM

## 2024-12-12 DIAGNOSIS — M77.11 LATERAL EPICONDYLITIS OF RIGHT ELBOW: Primary | ICD-10-CM

## 2024-12-12 RX ORDER — CLOMIPHENE CITRATE 50 MG/1
25 TABLET ORAL DAILY
COMMUNITY
Start: 2024-05-09

## 2024-12-12 NOTE — PROGRESS NOTES
relevant imaging, and chart documentation. Further this was inclusive of patient counseling regarding  treatment options,  alternatives to treatment, and the complications and risks related to those treatment options along with expectations of outcome related to those treatment. This excluded any separately billed procedures.          Follow up in: No follow-ups on file.         Sincerely,    Erin Purcell MD [facility]  Orthopaedic Surgeon - Hip Preservation & Sports Medicine   [facility] Sports Medicine and Orthopaedic [address] [place], [place] [id number]  Email: [email or web address]  Office: [phone]    The encounter with Segun An was carried out by myself, Dr Purcell, who personally examined the patient and reviewed the plan.      This dictation was performed with a verbal recognition program (DRAGON) and it was checked for errors.  It is possible that there are still dictated errors within this office note.  If so, please bring any errors to my attention for an addendum.  All efforts were made to ensure that this office note is accurate.       12/12/24  10:09 AM

## 2024-12-17 ENCOUNTER — TELEPHONE (OUTPATIENT)
Dept: ORTHOPEDIC SURGERY | Age: 44
End: 2024-12-17

## 2024-12-23 ENCOUNTER — TELEPHONE (OUTPATIENT)
Dept: ORTHOPEDIC SURGERY | Age: 44
End: 2024-12-23

## 2024-12-23 DIAGNOSIS — M77.11 LATERAL EPICONDYLITIS OF RIGHT ELBOW: Primary | ICD-10-CM

## 2024-12-23 NOTE — TELEPHONE ENCOUNTER
Spoke to patient.   MRI denied. Must complete PT.   Referral placed for [facility].     EN Dietrich

## 2025-01-23 ENCOUNTER — TELEPHONE (OUTPATIENT)
Dept: ORTHOPEDIC SURGERY | Age: 45
End: 2025-01-23

## 2025-01-23 DIAGNOSIS — M77.11 LATERAL EPICONDYLITIS OF RIGHT ELBOW: Primary | ICD-10-CM

## 2025-01-23 NOTE — TELEPHONE ENCOUNTER
PT IS REQUESTING A CALL BACK IN REGARDS TO SCHEDULING MRI, AND UPDATED INS. PT CAN BE REACHED AT [phone]

## 2025-01-24 NOTE — TELEPHONE ENCOUNTER
General Question     Subject: MRI FOR RT ELBOW  Patient and /or Facility Request: Segun An   Contact Number: [phone]     PATIENT CALLING REGARDING GETTING A MRI ORDER SUBMITTED FOR HIS  RT ELBOW.    PATIENT BCBS INSURANCE HAS BEEN UPDATED IN THE SYSTEM.    PLEASE CALL BACK PATIENT AT THE ABOVE NUMBER.

## 2025-01-27 NOTE — TELEPHONE ENCOUNTER
L/m on v/m for patient regarding insurance change and MRI order.  New order placed under [facility] insurance.  Once approved, MRI order will be faxed to JESSICA MCCONNELL for scheduling.  He was asked to call back with further questions in the meantime.

## (undated) DEVICE — BLADE SHV L13CM DIA4MM TAPR TIP SCIS LIKE CUT OVL OUTER

## (undated) DEVICE — PENCIL ES ULT VAC W TELSCP NOSE EZ CLN BLDE 10FT

## (undated) DEVICE — SUTURE VCRL SZ 0 L18IN ABSRB UD L36MM CT-1 1/2 CIR J840D

## (undated) DEVICE — SOLUTION IV 1000ML 0.9% SOD CHL

## (undated) DEVICE — SURE SET-DOUBLE BASIN-LF: Brand: MEDLINE INDUSTRIES, INC.

## (undated) DEVICE — 3M™ IOBAN™ 2 ANTIMICROBIAL INCISE DRAPE 6648EZ: Brand: IOBAN™ 2

## (undated) DEVICE — GLOVE SURG SZ 8.5 L11.85IN FNGR THK9.8MIL STRW LTX POLYMER

## (undated) DEVICE — GOWN,SIRUS,POLYRNF,BRTHSLV,XL,30/CS: Brand: MEDLINE

## (undated) DEVICE — GOWN,SIRUS,POLYRNF,BRTHSLV,XLN/XXL,18/CS: Brand: MEDLINE

## (undated) DEVICE — PACK PROCEDURE SURG ARTHROSCOPY

## (undated) DEVICE — SHEET,DRAPE,53X77,STERILE: Brand: MEDLINE

## (undated) DEVICE — MENISCECTOMY ELECTRODE BASIC KIT; STANDARD DESIGN, SHORT: Brand: CONMED

## (undated) DEVICE — APPLICATOR MEDICATED 26 CC SOLUTION HI LT ORNG CHLORAPREP

## (undated) DEVICE — TUBING FLD MGMT Y DBL SPIK DUALWAVE

## (undated) DEVICE — JEWISH HOSPITAL TURNOVER KIT: Brand: MEDLINE INDUSTRIES, INC.

## (undated) DEVICE — COVER LT HNDL BLU PLAS

## (undated) DEVICE — SUTURE VCRL UD BR CT 3-0 18IN CT1 J838D

## (undated) DEVICE — 87K ARTHROSCOPY TUBING SET: Brand: 87K

## (undated) DEVICE — SOLUTION IRRIG 3000ML LAC R FLX CONT

## (undated) DEVICE — COUNTER NDL 40 COUNT HLD 70 NUM FOAM BLK SGL MAG W BLDE REMV

## (undated) DEVICE — INTENDED FOR TISSUE SEPARATION, AND OTHER PROCEDURES THAT REQUIRE A SHARP SURGICAL BLADE TO PUNCTURE OR CUT.: Brand: BARD-PARKER ® CARBON RIB-BACK BLADES

## (undated) DEVICE — PLATE ES AD W 9FT CRD 2

## (undated) DEVICE — MARKER SURG SKIN UTIL BLK REG TIP NONSMEARING W/ 6IN RUL

## (undated) DEVICE — UNDERGLOVE SURG SZ 9 FNGR THK0.21MIL GRN LTX BEAD CUF

## (undated) DEVICE — FLUID TRAP FOR MINIVAC ES EQUIP FLD TRAP

## (undated) DEVICE — STRIP,CLOSURE,WOUND,MEDI-STRIP,1/2X4: Brand: MEDLINE